# Patient Record
Sex: FEMALE | Race: WHITE | Employment: OTHER | URBAN - METROPOLITAN AREA
[De-identification: names, ages, dates, MRNs, and addresses within clinical notes are randomized per-mention and may not be internally consistent; named-entity substitution may affect disease eponyms.]

---

## 2022-11-01 ENCOUNTER — APPOINTMENT (OUTPATIENT)
Dept: NON INVASIVE DIAGNOSTICS | Age: 62
DRG: 349 | End: 2022-11-01
Payer: OTHER GOVERNMENT

## 2022-11-01 ENCOUNTER — HOSPITAL ENCOUNTER (OUTPATIENT)
Dept: PREADMISSION TESTING | Age: 62
Discharge: HOME | DRG: 349 | End: 2022-11-01
Payer: OTHER GOVERNMENT

## 2022-11-01 ENCOUNTER — TRANSCRIBE ORDER (OUTPATIENT)
Dept: REGISTRATION | Age: 62
End: 2022-11-01

## 2022-11-01 VITALS — BODY MASS INDEX: 32.14 KG/M2 | WEIGHT: 200 LBS | HEIGHT: 66 IN

## 2022-11-01 DIAGNOSIS — D37.5 NEOPLASM OF UNCERTAIN BEHAVIOR OF STOMACH, INTESTINES, AND RECTUM: Primary | ICD-10-CM

## 2022-11-01 DIAGNOSIS — D37.5 NEOPLASM OF UNCERTAIN BEHAVIOR OF STOMACH, INTESTINES, AND RECTUM: ICD-10-CM

## 2022-11-01 DIAGNOSIS — D37.1 NEOPLASM OF UNCERTAIN BEHAVIOR OF STOMACH, INTESTINES, AND RECTUM: ICD-10-CM

## 2022-11-01 DIAGNOSIS — D37.8 NEOPLASM OF UNCERTAIN BEHAVIOR OF STOMACH, INTESTINES, AND RECTUM: Primary | ICD-10-CM

## 2022-11-01 DIAGNOSIS — D37.1 NEOPLASM OF UNCERTAIN BEHAVIOR OF STOMACH, INTESTINES, AND RECTUM: Primary | ICD-10-CM

## 2022-11-01 DIAGNOSIS — D37.8 NEOPLASM OF UNCERTAIN BEHAVIOR OF STOMACH, INTESTINES, AND RECTUM: ICD-10-CM

## 2022-11-01 LAB
ABO + RH BLD: NORMAL
ANION GAP SERPL CALC-SCNC: 4 MMOL/L (ref 3–18)
BLOOD GROUP ANTIBODIES SERPL: NORMAL
BUN SERPL-MCNC: 12 MG/DL (ref 7–18)
BUN/CREAT SERPL: 14 (ref 12–20)
CALCIUM SERPL-MCNC: 9.3 MG/DL (ref 8.5–10.1)
CHLORIDE SERPL-SCNC: 107 MMOL/L (ref 100–111)
CO2 SERPL-SCNC: 29 MMOL/L (ref 21–32)
CREAT SERPL-MCNC: 0.83 MG/DL (ref 0.6–1.3)
ERYTHROCYTE [DISTWIDTH] IN BLOOD BY AUTOMATED COUNT: 12.9 % (ref 11.6–14.5)
GLUCOSE SERPL-MCNC: 99 MG/DL (ref 74–99)
HCT VFR BLD AUTO: 44.8 % (ref 35–45)
HGB BLD-MCNC: 15.2 G/DL (ref 12–16)
MCH RBC QN AUTO: 29.3 PG (ref 24–34)
MCHC RBC AUTO-ENTMCNC: 33.9 G/DL (ref 31–37)
MCV RBC AUTO: 86.5 FL (ref 78–100)
NRBC # BLD: 0 K/UL (ref 0–0.01)
NRBC BLD-RTO: 0 PER 100 WBC
PLATELET # BLD AUTO: 229 K/UL (ref 135–420)
PMV BLD AUTO: 10.8 FL (ref 9.2–11.8)
POTASSIUM SERPL-SCNC: 4.2 MMOL/L (ref 3.5–5.5)
RBC # BLD AUTO: 5.18 M/UL (ref 4.2–5.3)
SODIUM SERPL-SCNC: 140 MMOL/L (ref 136–145)
SPECIMEN EXP DATE BLD: NORMAL
WBC # BLD AUTO: 8.8 K/UL (ref 4.6–13.2)

## 2022-11-01 PROCEDURE — 80048 BASIC METABOLIC PNL TOTAL CA: CPT

## 2022-11-01 PROCEDURE — 36415 COLL VENOUS BLD VENIPUNCTURE: CPT

## 2022-11-01 PROCEDURE — 86900 BLOOD TYPING SEROLOGIC ABO: CPT

## 2022-11-01 PROCEDURE — 93005 ELECTROCARDIOGRAM TRACING: CPT

## 2022-11-01 PROCEDURE — 85027 COMPLETE CBC AUTOMATED: CPT

## 2022-11-01 RX ORDER — SODIUM CHLORIDE, SODIUM LACTATE, POTASSIUM CHLORIDE, CALCIUM CHLORIDE 600; 310; 30; 20 MG/100ML; MG/100ML; MG/100ML; MG/100ML
125 INJECTION, SOLUTION INTRAVENOUS CONTINUOUS
Status: CANCELLED | OUTPATIENT
Start: 2022-11-01

## 2022-11-01 RX ORDER — ALENDRONATE SODIUM 70 MG/1
70 TABLET ORAL
COMMUNITY
Start: 2022-10-11 | End: 2023-10-11

## 2022-11-01 RX ORDER — HEPARIN SODIUM 5000 [USP'U]/ML
5000 INJECTION, SOLUTION INTRAVENOUS; SUBCUTANEOUS ONCE
Status: CANCELLED | OUTPATIENT
Start: 2022-11-01 | End: 2022-11-01

## 2022-11-01 RX ORDER — METRONIDAZOLE 500 MG/100ML
500 INJECTION, SOLUTION INTRAVENOUS ONCE
Status: CANCELLED | OUTPATIENT
Start: 2022-11-01 | End: 2022-11-01

## 2022-11-01 RX ORDER — UREA 10 %
1 LOTION (ML) TOPICAL DAILY
COMMUNITY
Start: 2022-07-18 | End: 2023-07-18

## 2022-11-01 RX ORDER — ERGOCALCIFEROL 1.25 MG/1
50000 CAPSULE ORAL
COMMUNITY
Start: 2022-07-18 | End: 2023-10-12

## 2022-11-01 NOTE — PERIOP NOTES
PAT - SURGICAL PRE-ADMISSION INSTRUCTIONS    NAME:  Jaden Arango                                                          TODAY'S DATE:  11/1/2022    SURGERY DATE:  11/4/2022                                  SURGERY ARRIVAL TIME:   TBA    Do NOT eat or drink anything, including candy or gum, after MIDNIGHT on 11/4/2022 , unless you have specific instructions from your Surgeon or Anesthesia Provider to do so. No smoking 24 hours before surgery. No alcohol 24 hours prior to the day of surgery. No recreational drugs for one week prior to the day of surgery. Leave all valuables, including money/purse, at home. Remove all jewelry, nail polish, makeup (including mascara); no lotions, powders, deodorant, or perfume/cologne/after shave. Glasses/Contact lenses and Dentures may be worn to the hospital.  They will be removed prior to surgery. Call your doctor if symptoms of a cold or illness develop within 24 ours prior to surgery. AN ADULT MUST DRIVE YOU HOME AFTER OUTPATIENT SURGERY. If you are having an OUTPATIENT procedure, please make arrangements for a responsible adult to be with you for 24 hours after your surgery. If you are admitted to the hospital, you will be assigned to a bed after surgery is complete. Normally a family member will not be able to see you until you are in your assigned bed. 12. Visitation Restrictions Explained. Special Instructions:  Covid Test not needed Patient vaccinated, Quarantine requirements discussed  No Advanced Directive or DNR  NONE. Patient Prep:Clear liquids no solids  all day before surgery   Drink 2 bottles Ensure Presurgery nutrition drink PM  Do not chew gum . Day of surgery Do not chew gum DDrink 1 bottle of Ensure   Pre surgery nutrition drink finish 2 hours prior to arrival at THE [facility], then nothing else by mouth .  Bring all paperwork with you on day of surgery   use CHG solution three nights prior to procedure      These surgical instructions were reviewed with patient and spouse Alex Doug  during the PAT phone call

## 2022-11-02 LAB
ATRIAL RATE: 66 BPM
CALCULATED P AXIS, ECG09: 59 DEGREES
CALCULATED R AXIS, ECG10: 46 DEGREES
CALCULATED T AXIS, ECG11: 36 DEGREES
DIAGNOSIS, 93000: NORMAL
P-R INTERVAL, ECG05: 140 MS
Q-T INTERVAL, ECG07: 410 MS
QRS DURATION, ECG06: 82 MS
QTC CALCULATION (BEZET), ECG08: 429 MS
VENTRICULAR RATE, ECG03: 66 BPM

## 2022-11-04 ENCOUNTER — ANESTHESIA (OUTPATIENT)
Dept: SURGERY | Age: 62
DRG: 349 | End: 2022-11-04
Payer: OTHER GOVERNMENT

## 2022-11-04 ENCOUNTER — ANESTHESIA EVENT (OUTPATIENT)
Dept: SURGERY | Age: 62
DRG: 349 | End: 2022-11-04
Payer: OTHER GOVERNMENT

## 2022-11-04 ENCOUNTER — HOSPITAL ENCOUNTER (INPATIENT)
Age: 62
LOS: 1 days | Discharge: HOME | DRG: 349 | End: 2022-11-05
Attending: SURGERY | Admitting: SURGERY
Payer: OTHER GOVERNMENT

## 2022-11-04 PROBLEM — K62.89 RECTAL MASS: Status: ACTIVE | Noted: 2022-11-04

## 2022-11-04 LAB
INR PPP: 1.2 (ref 0.8–1.2)
PROTHROMBIN TIME: 15.4 SEC (ref 11.5–15.2)

## 2022-11-04 PROCEDURE — 77030031756 HC ACC TRANSANAL GELPNT SYS AMR -F: Performed by: SURGERY

## 2022-11-04 PROCEDURE — 76210000006 HC OR PH I REC 0.5 TO 1 HR: Performed by: SURGERY

## 2022-11-04 PROCEDURE — 77030031139 HC SUT VCRL2 J&J -A: Performed by: SURGERY

## 2022-11-04 PROCEDURE — 77030020407 HC IV BLD WRMR ST 3M -A: Performed by: ANESTHESIOLOGY

## 2022-11-04 PROCEDURE — 77030002916 HC SUT ETHLN J&J -A: Performed by: SURGERY

## 2022-11-04 PROCEDURE — 74011000254 HC RX REV CODE- 254: Performed by: SURGERY

## 2022-11-04 PROCEDURE — 74011250637 HC RX REV CODE- 250/637: Performed by: SURGERY

## 2022-11-04 PROCEDURE — 2709999900 HC NON-CHARGEABLE SUPPLY: Performed by: SURGERY

## 2022-11-04 PROCEDURE — 77030021052 HC RNG RETRCTR STAY COOP -A: Performed by: SURGERY

## 2022-11-04 PROCEDURE — 74011000250 HC RX REV CODE- 250: Performed by: SURGERY

## 2022-11-04 PROCEDURE — 77030034479 HC ADH SKN CLSR PRINEO J&J -B: Performed by: SURGERY

## 2022-11-04 PROCEDURE — 74011000250 HC RX REV CODE- 250

## 2022-11-04 PROCEDURE — 77030002901 HC SUT DEV MCLOSE MPSA - B: Performed by: SURGERY

## 2022-11-04 PROCEDURE — 74011250636 HC RX REV CODE- 250/636: Performed by: ANESTHESIOLOGY

## 2022-11-04 PROCEDURE — 77030016151 HC PROTCTR LNS DFOG COVD -B: Performed by: SURGERY

## 2022-11-04 PROCEDURE — C1758 CATHETER, URETERAL: HCPCS | Performed by: SURGERY

## 2022-11-04 PROCEDURE — 77030033138 HC SUT PGA STRATFX J&J -B: Performed by: SURGERY

## 2022-11-04 PROCEDURE — 77030018390 HC SPNG HEMSTAT2 J&J -B: Performed by: SURGERY

## 2022-11-04 PROCEDURE — 77030035279 HC SEAL VSL ENDOWR XI INTU -I2: Performed by: SURGERY

## 2022-11-04 PROCEDURE — 77030035489 HC REDUCR CANN ENDOWR INTU -C: Performed by: SURGERY

## 2022-11-04 PROCEDURE — 77030002966 HC SUT PDS J&J -A: Performed by: SURGERY

## 2022-11-04 PROCEDURE — 88305 TISSUE EXAM BY PATHOLOGIST: CPT

## 2022-11-04 PROCEDURE — 76060000038 HC ANESTHESIA 3.5 TO 4 HR: Performed by: SURGERY

## 2022-11-04 PROCEDURE — 77030040361 HC SLV COMPR DVT MDII -B: Performed by: SURGERY

## 2022-11-04 PROCEDURE — 85610 PROTHROMBIN TIME: CPT

## 2022-11-04 PROCEDURE — 74011250636 HC RX REV CODE- 250/636: Performed by: SURGERY

## 2022-11-04 PROCEDURE — 77030040506 HC DRN WND MDII -A: Performed by: SURGERY

## 2022-11-04 PROCEDURE — 77010033678 HC OXYGEN DAILY

## 2022-11-04 PROCEDURE — 36415 COLL VENOUS BLD VENIPUNCTURE: CPT

## 2022-11-04 PROCEDURE — 74011250636 HC RX REV CODE- 250/636: Performed by: NURSE ANESTHETIST, CERTIFIED REGISTERED

## 2022-11-04 PROCEDURE — 77030031492 HC PRT ACC BLNT AIRSEAL CNMD -B: Performed by: SURGERY

## 2022-11-04 PROCEDURE — 0T768DZ DILATION OF RIGHT URETER WITH INTRALUMINAL DEVICE, VIA NATURAL OR ARTIFICIAL OPENING ENDOSCOPIC: ICD-10-PCS | Performed by: SURGERY

## 2022-11-04 PROCEDURE — 77030020703 HC SEAL CANN DISP INTU -B: Performed by: SURGERY

## 2022-11-04 PROCEDURE — 77030040830 HC CATH URETH FOL MDII -A: Performed by: SURGERY

## 2022-11-04 PROCEDURE — 77030027138 HC INCENT SPIROMETER -A: Performed by: SURGERY

## 2022-11-04 PROCEDURE — 77030002996 HC SUT SLK J&J -A: Performed by: SURGERY

## 2022-11-04 PROCEDURE — 77030020782 HC GWN BAIR PAWS FLX 3M -B: Performed by: SURGERY

## 2022-11-04 PROCEDURE — 65270000029 HC RM PRIVATE

## 2022-11-04 PROCEDURE — 76010000134 HC OR TIME 3.5 TO 4 HR: Performed by: SURGERY

## 2022-11-04 PROCEDURE — 74011000258 HC RX REV CODE- 258: Performed by: SURGERY

## 2022-11-04 PROCEDURE — 77030008574 HC TBNG SUC IRR STRY -B: Performed by: SURGERY

## 2022-11-04 PROCEDURE — 77030010031 HC SCIS ENDOSC MPLR J&J -C: Performed by: SURGERY

## 2022-11-04 PROCEDURE — 77030035236 HC SUT PDS STRATFX BARB J&J -B: Performed by: SURGERY

## 2022-11-04 PROCEDURE — 0DBP7ZZ EXCISION OF RECTUM, VIA NATURAL OR ARTIFICIAL OPENING: ICD-10-PCS | Performed by: SURGERY

## 2022-11-04 PROCEDURE — 77030018836 HC SOL IRR NACL ICUM -A: Performed by: SURGERY

## 2022-11-04 PROCEDURE — 74011000250 HC RX REV CODE- 250: Performed by: NURSE ANESTHETIST, CERTIFIED REGISTERED

## 2022-11-04 PROCEDURE — C1769 GUIDE WIRE: HCPCS | Performed by: SURGERY

## 2022-11-04 PROCEDURE — 97161 PT EVAL LOW COMPLEX 20 MIN: CPT

## 2022-11-04 PROCEDURE — 77030008477 HC STYL SATN SLP COVD -A: Performed by: ANESTHESIOLOGY

## 2022-11-04 PROCEDURE — 77030010011 HC RNG RETRCTR STAY COOP -B: Performed by: SURGERY

## 2022-11-04 PROCEDURE — 77030035277 HC OBTRTR BLDELSS DISP INTU -B: Performed by: SURGERY

## 2022-11-04 PROCEDURE — 97116 GAIT TRAINING THERAPY: CPT

## 2022-11-04 PROCEDURE — 77030008683 HC TU ET CUF COVD -A: Performed by: ANESTHESIOLOGY

## 2022-11-04 PROCEDURE — 77030040504 HC DRN WND MDII -B: Performed by: SURGERY

## 2022-11-04 PROCEDURE — 77030002933 HC SUT MCRYL J&J -A: Performed by: SURGERY

## 2022-11-04 PROCEDURE — 77030014007 HC SPNG HEMSTAT J&J -B: Performed by: SURGERY

## 2022-11-04 PROCEDURE — 77030012852 HC LIGASURE LAP SEAL COVD -F: Performed by: SURGERY

## 2022-11-04 PROCEDURE — 77030006643: Performed by: ANESTHESIOLOGY

## 2022-11-04 RX ORDER — ONDANSETRON 2 MG/ML
4 INJECTION INTRAMUSCULAR; INTRAVENOUS
Status: DISCONTINUED | OUTPATIENT
Start: 2022-11-04 | End: 2022-11-05 | Stop reason: HOSPADM

## 2022-11-04 RX ORDER — SODIUM CHLORIDE 0.9 % (FLUSH) 0.9 %
5-40 SYRINGE (ML) INJECTION AS NEEDED
Status: DISCONTINUED | OUTPATIENT
Start: 2022-11-04 | End: 2022-11-04 | Stop reason: HOSPADM

## 2022-11-04 RX ORDER — GLYCOPYRROLATE 0.2 MG/ML
INJECTION INTRAMUSCULAR; INTRAVENOUS AS NEEDED
Status: DISCONTINUED | OUTPATIENT
Start: 2022-11-04 | End: 2022-11-04 | Stop reason: HOSPADM

## 2022-11-04 RX ORDER — DEXTROSE, SODIUM CHLORIDE, AND POTASSIUM CHLORIDE 5; .45; .15 G/100ML; G/100ML; G/100ML
125 INJECTION INTRAVENOUS CONTINUOUS
Status: DISCONTINUED | OUTPATIENT
Start: 2022-11-04 | End: 2022-11-05 | Stop reason: HOSPADM

## 2022-11-04 RX ORDER — INDOCYANINE GREEN AND WATER 25 MG
KIT INJECTION AS NEEDED
Status: DISCONTINUED | OUTPATIENT
Start: 2022-11-04 | End: 2022-11-04 | Stop reason: HOSPADM

## 2022-11-04 RX ORDER — SODIUM CHLORIDE 0.9 % (FLUSH) 0.9 %
5-40 SYRINGE (ML) INJECTION EVERY 8 HOURS
Status: DISCONTINUED | OUTPATIENT
Start: 2022-11-04 | End: 2022-11-05 | Stop reason: HOSPADM

## 2022-11-04 RX ORDER — METRONIDAZOLE 500 MG/100ML
500 INJECTION, SOLUTION INTRAVENOUS EVERY 12 HOURS
Status: DISCONTINUED | OUTPATIENT
Start: 2022-11-04 | End: 2022-11-05 | Stop reason: HOSPADM

## 2022-11-04 RX ORDER — POTASSIUM CHLORIDE 7.45 MG/ML
10 INJECTION INTRAVENOUS AS NEEDED
Status: DISCONTINUED | OUTPATIENT
Start: 2022-11-04 | End: 2022-11-05 | Stop reason: HOSPADM

## 2022-11-04 RX ORDER — ONDANSETRON 2 MG/ML
INJECTION INTRAMUSCULAR; INTRAVENOUS AS NEEDED
Status: DISCONTINUED | OUTPATIENT
Start: 2022-11-04 | End: 2022-11-04 | Stop reason: HOSPADM

## 2022-11-04 RX ORDER — LIDOCAINE HYDROCHLORIDE 20 MG/ML
JELLY TOPICAL AS NEEDED
Status: DISCONTINUED | OUTPATIENT
Start: 2022-11-04 | End: 2022-11-04 | Stop reason: HOSPADM

## 2022-11-04 RX ORDER — ALBUTEROL SULFATE 0.83 MG/ML
2.5 SOLUTION RESPIRATORY (INHALATION) AS NEEDED
Status: DISCONTINUED | OUTPATIENT
Start: 2022-11-04 | End: 2022-11-04 | Stop reason: HOSPADM

## 2022-11-04 RX ORDER — LIDOCAINE HYDROCHLORIDE 20 MG/ML
INJECTION, SOLUTION EPIDURAL; INFILTRATION; INTRACAUDAL; PERINEURAL AS NEEDED
Status: DISCONTINUED | OUTPATIENT
Start: 2022-11-04 | End: 2022-11-04 | Stop reason: HOSPADM

## 2022-11-04 RX ORDER — SODIUM CHLORIDE 0.9 % (FLUSH) 0.9 %
5-40 SYRINGE (ML) INJECTION EVERY 8 HOURS
Status: DISCONTINUED | OUTPATIENT
Start: 2022-11-04 | End: 2022-11-04 | Stop reason: HOSPADM

## 2022-11-04 RX ORDER — MAGNESIUM SULFATE HEPTAHYDRATE 40 MG/ML
2 INJECTION, SOLUTION INTRAVENOUS AS NEEDED
Status: DISCONTINUED | OUTPATIENT
Start: 2022-11-04 | End: 2022-11-05 | Stop reason: HOSPADM

## 2022-11-04 RX ORDER — SODIUM CHLORIDE 9 MG/ML
50 INJECTION, SOLUTION INTRAVENOUS CONTINUOUS
Status: DISCONTINUED | OUTPATIENT
Start: 2022-11-04 | End: 2022-11-05 | Stop reason: HOSPADM

## 2022-11-04 RX ORDER — METOCLOPRAMIDE HYDROCHLORIDE 5 MG/ML
INJECTION INTRAMUSCULAR; INTRAVENOUS AS NEEDED
Status: DISCONTINUED | OUTPATIENT
Start: 2022-11-04 | End: 2022-11-04 | Stop reason: HOSPADM

## 2022-11-04 RX ORDER — ROCURONIUM BROMIDE 10 MG/ML
INJECTION, SOLUTION INTRAVENOUS AS NEEDED
Status: DISCONTINUED | OUTPATIENT
Start: 2022-11-04 | End: 2022-11-04 | Stop reason: HOSPADM

## 2022-11-04 RX ORDER — MAGNESIUM SULFATE 100 %
4 CRYSTALS MISCELLANEOUS AS NEEDED
Status: DISCONTINUED | OUTPATIENT
Start: 2022-11-04 | End: 2022-11-04 | Stop reason: HOSPADM

## 2022-11-04 RX ORDER — SUCCINYLCHOLINE CHLORIDE 100 MG/5ML
SYRINGE (ML) INTRAVENOUS AS NEEDED
Status: DISCONTINUED | OUTPATIENT
Start: 2022-11-04 | End: 2022-11-04 | Stop reason: HOSPADM

## 2022-11-04 RX ORDER — KETAMINE HCL IN 0.9 % NACL 50 MG/5 ML
SYRINGE (ML) INTRAVENOUS AS NEEDED
Status: DISCONTINUED | OUTPATIENT
Start: 2022-11-04 | End: 2022-11-04 | Stop reason: HOSPADM

## 2022-11-04 RX ORDER — SODIUM CHLORIDE, SODIUM LACTATE, POTASSIUM CHLORIDE, CALCIUM CHLORIDE 600; 310; 30; 20 MG/100ML; MG/100ML; MG/100ML; MG/100ML
50 INJECTION, SOLUTION INTRAVENOUS CONTINUOUS
Status: DISCONTINUED | OUTPATIENT
Start: 2022-11-04 | End: 2022-11-04

## 2022-11-04 RX ORDER — FENTANYL CITRATE 50 UG/ML
INJECTION, SOLUTION INTRAMUSCULAR; INTRAVENOUS AS NEEDED
Status: DISCONTINUED | OUTPATIENT
Start: 2022-11-04 | End: 2022-11-04 | Stop reason: HOSPADM

## 2022-11-04 RX ORDER — MORPHINE SULFATE 4 MG/ML
2 INJECTION INTRAVENOUS
Status: DISCONTINUED | OUTPATIENT
Start: 2022-11-04 | End: 2022-11-05 | Stop reason: HOSPADM

## 2022-11-04 RX ORDER — METRONIDAZOLE 500 MG/100ML
500 INJECTION, SOLUTION INTRAVENOUS ONCE
Status: COMPLETED | OUTPATIENT
Start: 2022-11-04 | End: 2022-11-04

## 2022-11-04 RX ORDER — SODIUM CHLORIDE, SODIUM LACTATE, POTASSIUM CHLORIDE, CALCIUM CHLORIDE 600; 310; 30; 20 MG/100ML; MG/100ML; MG/100ML; MG/100ML
INJECTION, SOLUTION INTRAVENOUS
Status: DISCONTINUED | OUTPATIENT
Start: 2022-11-04 | End: 2022-11-04 | Stop reason: HOSPADM

## 2022-11-04 RX ORDER — HEPARIN SODIUM 5000 [USP'U]/ML
5000 INJECTION, SOLUTION INTRAVENOUS; SUBCUTANEOUS ONCE
Status: COMPLETED | OUTPATIENT
Start: 2022-11-04 | End: 2022-11-04

## 2022-11-04 RX ORDER — PROPOFOL 10 MG/ML
INJECTION, EMULSION INTRAVENOUS AS NEEDED
Status: DISCONTINUED | OUTPATIENT
Start: 2022-11-04 | End: 2022-11-04 | Stop reason: HOSPADM

## 2022-11-04 RX ORDER — ONDANSETRON 4 MG/1
4 TABLET, ORALLY DISINTEGRATING ORAL
Status: DISCONTINUED | OUTPATIENT
Start: 2022-11-04 | End: 2022-11-05 | Stop reason: HOSPADM

## 2022-11-04 RX ORDER — INSULIN LISPRO 100 [IU]/ML
INJECTION, SOLUTION INTRAVENOUS; SUBCUTANEOUS ONCE
Status: DISCONTINUED | OUTPATIENT
Start: 2022-11-04 | End: 2022-11-04 | Stop reason: HOSPADM

## 2022-11-04 RX ORDER — HYDROMORPHONE HYDROCHLORIDE 1 MG/ML
0.5 INJECTION, SOLUTION INTRAMUSCULAR; INTRAVENOUS; SUBCUTANEOUS
Status: DISCONTINUED | OUTPATIENT
Start: 2022-11-04 | End: 2022-11-04 | Stop reason: HOSPADM

## 2022-11-04 RX ORDER — MIDAZOLAM HYDROCHLORIDE 1 MG/ML
INJECTION, SOLUTION INTRAMUSCULAR; INTRAVENOUS AS NEEDED
Status: DISCONTINUED | OUTPATIENT
Start: 2022-11-04 | End: 2022-11-04 | Stop reason: HOSPADM

## 2022-11-04 RX ORDER — FENTANYL CITRATE 50 UG/ML
25 INJECTION, SOLUTION INTRAMUSCULAR; INTRAVENOUS AS NEEDED
Status: DISCONTINUED | OUTPATIENT
Start: 2022-11-04 | End: 2022-11-04 | Stop reason: HOSPADM

## 2022-11-04 RX ORDER — KETOROLAC TROMETHAMINE 30 MG/ML
30 INJECTION, SOLUTION INTRAMUSCULAR; INTRAVENOUS EVERY 6 HOURS
Status: DISCONTINUED | OUTPATIENT
Start: 2022-11-04 | End: 2022-11-05 | Stop reason: HOSPADM

## 2022-11-04 RX ORDER — PHENYLEPHRINE HCL IN 0.9% NACL 1 MG/10 ML
SYRINGE (ML) INTRAVENOUS AS NEEDED
Status: DISCONTINUED | OUTPATIENT
Start: 2022-11-04 | End: 2022-11-04 | Stop reason: HOSPADM

## 2022-11-04 RX ORDER — MORPHINE SULFATE 4 MG/ML
4 INJECTION INTRAVENOUS
Status: DISCONTINUED | OUTPATIENT
Start: 2022-11-04 | End: 2022-11-05 | Stop reason: HOSPADM

## 2022-11-04 RX ORDER — DEXAMETHASONE SODIUM PHOSPHATE 4 MG/ML
INJECTION, SOLUTION INTRA-ARTICULAR; INTRALESIONAL; INTRAMUSCULAR; INTRAVENOUS; SOFT TISSUE AS NEEDED
Status: DISCONTINUED | OUTPATIENT
Start: 2022-11-04 | End: 2022-11-04 | Stop reason: HOSPADM

## 2022-11-04 RX ORDER — ACETAMINOPHEN 500 MG
1000 TABLET ORAL 3 TIMES DAILY
Status: DISCONTINUED | OUTPATIENT
Start: 2022-11-04 | End: 2022-11-05 | Stop reason: HOSPADM

## 2022-11-04 RX ORDER — SODIUM CHLORIDE 0.9 % (FLUSH) 0.9 %
5-40 SYRINGE (ML) INJECTION AS NEEDED
Status: DISCONTINUED | OUTPATIENT
Start: 2022-11-04 | End: 2022-11-05 | Stop reason: HOSPADM

## 2022-11-04 RX ORDER — SODIUM CHLORIDE, SODIUM LACTATE, POTASSIUM CHLORIDE, CALCIUM CHLORIDE 600; 310; 30; 20 MG/100ML; MG/100ML; MG/100ML; MG/100ML
75 INJECTION, SOLUTION INTRAVENOUS CONTINUOUS
Status: DISCONTINUED | OUTPATIENT
Start: 2022-11-04 | End: 2022-11-04 | Stop reason: HOSPADM

## 2022-11-04 RX ORDER — SODIUM CHLORIDE, SODIUM LACTATE, POTASSIUM CHLORIDE, CALCIUM CHLORIDE 600; 310; 30; 20 MG/100ML; MG/100ML; MG/100ML; MG/100ML
125 INJECTION, SOLUTION INTRAVENOUS CONTINUOUS
Status: DISCONTINUED | OUTPATIENT
Start: 2022-11-04 | End: 2022-11-05 | Stop reason: HOSPADM

## 2022-11-04 RX ADMIN — ACETAMINOPHEN 1000 MG: 500 TABLET ORAL at 21:24

## 2022-11-04 RX ADMIN — MIDAZOLAM 2 MG: 1 INJECTION INTRAMUSCULAR; INTRAVENOUS at 07:27

## 2022-11-04 RX ADMIN — FENTANYL CITRATE 50 MCG: 50 INJECTION, SOLUTION INTRAMUSCULAR; INTRAVENOUS at 07:34

## 2022-11-04 RX ADMIN — HYDROMORPHONE HYDROCHLORIDE 0.5 MG: 1 INJECTION, SOLUTION INTRAMUSCULAR; INTRAVENOUS; SUBCUTANEOUS at 11:26

## 2022-11-04 RX ADMIN — Medication 10 MG: at 09:53

## 2022-11-04 RX ADMIN — ONDANSETRON HYDROCHLORIDE 4 MG: 2 INJECTION INTRAMUSCULAR; INTRAVENOUS at 10:48

## 2022-11-04 RX ADMIN — HEPARIN SODIUM 5000 UNITS: 5000 INJECTION INTRAVENOUS; SUBCUTANEOUS at 06:31

## 2022-11-04 RX ADMIN — Medication 100 MCG: at 07:50

## 2022-11-04 RX ADMIN — SODIUM CHLORIDE 50 ML/HR: 9 INJECTION, SOLUTION INTRAVENOUS at 06:13

## 2022-11-04 RX ADMIN — FENTANYL CITRATE 25 MCG: 50 INJECTION, SOLUTION INTRAMUSCULAR; INTRAVENOUS at 10:18

## 2022-11-04 RX ADMIN — Medication 100 MCG: at 07:41

## 2022-11-04 RX ADMIN — SODIUM CHLORIDE, SODIUM LACTATE, POTASSIUM CHLORIDE, AND CALCIUM CHLORIDE 125 ML/HR: 600; 310; 30; 20 INJECTION, SOLUTION INTRAVENOUS at 06:07

## 2022-11-04 RX ADMIN — PROPOFOL 180 MG: 10 INJECTION, EMULSION INTRAVENOUS at 07:36

## 2022-11-04 RX ADMIN — Medication 100 MCG: at 07:52

## 2022-11-04 RX ADMIN — GLYCOPYRROLATE 0.2 MG: 0.2 INJECTION INTRAMUSCULAR; INTRAVENOUS at 07:27

## 2022-11-04 RX ADMIN — SODIUM CHLORIDE, SODIUM LACTATE, POTASSIUM CHLORIDE, AND CALCIUM CHLORIDE: 600; 310; 30; 20 INJECTION, SOLUTION INTRAVENOUS at 08:16

## 2022-11-04 RX ADMIN — DEXAMETHASONE SODIUM PHOSPHATE 8 MG: 4 INJECTION, SOLUTION INTRAMUSCULAR; INTRAVENOUS at 08:00

## 2022-11-04 RX ADMIN — Medication 100 MCG: at 08:41

## 2022-11-04 RX ADMIN — Medication 140 MG: at 07:36

## 2022-11-04 RX ADMIN — Medication 100 MCG: at 08:17

## 2022-11-04 RX ADMIN — ROCURONIUM BROMIDE 10 MG: 10 INJECTION, SOLUTION INTRAVENOUS at 07:36

## 2022-11-04 RX ADMIN — Medication 100 MCG: at 08:36

## 2022-11-04 RX ADMIN — MORPHINE SULFATE 4 MG: 4 INJECTION INTRAVENOUS at 12:34

## 2022-11-04 RX ADMIN — LIDOCAINE HYDROCHLORIDE 100 MG: 20 INJECTION, SOLUTION INTRAVENOUS at 07:36

## 2022-11-04 RX ADMIN — Medication 100 MCG: at 08:56

## 2022-11-04 RX ADMIN — CEFTRIAXONE SODIUM 2 G: 2 INJECTION, POWDER, FOR SOLUTION INTRAMUSCULAR; INTRAVENOUS at 07:27

## 2022-11-04 RX ADMIN — POTASSIUM CHLORIDE, DEXTROSE MONOHYDRATE AND SODIUM CHLORIDE 125 ML/HR: 150; 5; 450 INJECTION, SOLUTION INTRAVENOUS at 21:25

## 2022-11-04 RX ADMIN — FENTANYL CITRATE 50 MCG: 50 INJECTION, SOLUTION INTRAMUSCULAR; INTRAVENOUS at 07:30

## 2022-11-04 RX ADMIN — Medication 100 MCG: at 08:21

## 2022-11-04 RX ADMIN — ROCURONIUM BROMIDE 20 MG: 10 INJECTION, SOLUTION INTRAVENOUS at 08:07

## 2022-11-04 RX ADMIN — Medication 10 MG: at 09:33

## 2022-11-04 RX ADMIN — SODIUM CHLORIDE, SODIUM LACTATE, POTASSIUM CHLORIDE, AND CALCIUM CHLORIDE: 600; 310; 30; 20 INJECTION, SOLUTION INTRAVENOUS at 08:24

## 2022-11-04 RX ADMIN — ROCURONIUM BROMIDE 40 MG: 10 INJECTION, SOLUTION INTRAVENOUS at 07:44

## 2022-11-04 RX ADMIN — ROCURONIUM BROMIDE 20 MG: 10 INJECTION, SOLUTION INTRAVENOUS at 09:53

## 2022-11-04 RX ADMIN — METRONIDAZOLE 500 MG: 500 INJECTION, SOLUTION INTRAVENOUS at 07:44

## 2022-11-04 RX ADMIN — Medication 100 MCG: at 07:36

## 2022-11-04 RX ADMIN — ROCURONIUM BROMIDE 30 MG: 10 INJECTION, SOLUTION INTRAVENOUS at 08:22

## 2022-11-04 RX ADMIN — Medication 10 MG: at 08:36

## 2022-11-04 RX ADMIN — Medication 10 MG: at 09:01

## 2022-11-04 RX ADMIN — Medication 100 MCG: at 08:02

## 2022-11-04 RX ADMIN — Medication 100 MCG: at 08:12

## 2022-11-04 RX ADMIN — KETOROLAC TROMETHAMINE 30 MG: 30 INJECTION, SOLUTION INTRAMUSCULAR; INTRAVENOUS at 12:34

## 2022-11-04 RX ADMIN — Medication 10 MG: at 07:57

## 2022-11-04 RX ADMIN — KETOROLAC TROMETHAMINE 30 MG: 30 INJECTION, SOLUTION INTRAMUSCULAR; INTRAVENOUS at 17:37

## 2022-11-04 RX ADMIN — ACETAMINOPHEN 1000 MG: 500 TABLET ORAL at 17:36

## 2022-11-04 RX ADMIN — Medication 100 MCG: at 08:28

## 2022-11-04 RX ADMIN — Medication 100 MCG: at 07:56

## 2022-11-04 RX ADMIN — METRONIDAZOLE 500 MG: 500 INJECTION, SOLUTION INTRAVENOUS at 21:25

## 2022-11-04 RX ADMIN — ROCURONIUM BROMIDE 10 MG: 10 INJECTION, SOLUTION INTRAVENOUS at 10:20

## 2022-11-04 RX ADMIN — SODIUM CHLORIDE, PRESERVATIVE FREE 10 ML: 5 INJECTION INTRAVENOUS at 21:27

## 2022-11-04 RX ADMIN — METOCLOPRAMIDE 10 MG: 5 INJECTION, SOLUTION INTRAMUSCULAR; INTRAVENOUS at 10:48

## 2022-11-04 RX ADMIN — POTASSIUM CHLORIDE, DEXTROSE MONOHYDRATE AND SODIUM CHLORIDE 125 ML/HR: 150; 5; 450 INJECTION, SOLUTION INTRAVENOUS at 12:35

## 2022-11-04 RX ADMIN — ROCURONIUM BROMIDE 10 MG: 10 INJECTION, SOLUTION INTRAVENOUS at 09:16

## 2022-11-04 NOTE — OP NOTES
OPERATIVE NOTE    Patient: Bernie Bradshaw MRN: [id number]  SSN: [id number]    YOB: 1960  Age: 65 y.o. Sex: female      Indications: This is a 65y.o. year-old female who presents with rectal mass unresectable by endoscopic means. she  was positive for possible T1 lesion vs polyp by MRI. The patient was admitted for surgery for definitive diagnosis and cancer rule out. Date of Procedure: 11/4/2022     Preoperative Diagnosis: RECTAL MASS    Postoperative Diagnosis: RECTAL MASS      Procedure: Procedure(s):  TRANSANAL EXCISION MINIMALLY INVASIVE SURGERY (TAMIS)RESECTION OF RECTAL MASS MICROSURGICAL APPROACH - FULL THICKNESS , CPT 0184T  CYSTOSCOPY WITH BILATERAL URETERAL CATHETER PLACEMENT AND INJECTION  **ERAS**    Surgeon(s): Surgeon(s) and Role:     Mendoza Hoffmann DO - Primary    Assistant(s): Circ-1: Machelle Dover RN  Circ-2: Fanny Brower RN  Scrub Tech-1: Gabriela Ashford  Surg Asst-1: Marie Madden    Anesthesia: General     Procedure: The patient was consented and all questions answered. Consent was verified. The patient was brought to the OR and placed in the supine position. Pre-operative antibiotics were dosed prior to the incision. Heparin was given in the preoperative holding area for DVT prophylaxis. The patient was secured with a lower body strap and all pressure points were padded. A ranulfo hugger warming unit was placed across the upper body and SCDs were placed to bilateral lower extremities. General anesthesia was then induced without complication. A Garcia catheter was placed after the patient was anesthetized. The patients abdomen was prepped and draped in the standard sterile fashion. A surgical timeout was completed. A 30 degree cystoscope was placed under direct visualization through the urethra into the bladder. The right and left ureteral orifices were identified.   Under direct visualization stent was placed over a wire into the right ureteral orifice to approximately 15 cm. ICG 3ccs was injected and flushed with saline. This was repeated in the left ureteral orifice without difficulty. The cystoscope was then removed. A Garcia was then placed into the bladder and after confirmation in the bladder the balloon was insufflated. A regional anal block was performed with exparal.   A LI was performed and the staple line could not be palpated to 6cm. A lone star retractor was placed and the [employer] TAMIS 5.5cm port was placed into the anal canal without difficulty. The rectum was insufflated and a camera was placed into the lumen. Upon visual inspection the mass was identified in the posterior right approximately 11-12cm from the anal verge. The mass was on the superior edge of the haustral fold in a difficult location. The mass was grasped and elevated. It was not fixed. A full thickness circumferential resection was performed with ligasure to the mesenteric fat. The mass was removed. No apparent complications were noted and the peritoneum was not entered. The proctotomy was then closed with a running 2-0 pds v-loc. The area was irrigated and hemostasis was assured. The TAMIS device was then removed along with the lone star. A gelfoam with surgicel was placed for hemorrhoidal oozing. The patient was awakened, extubated and transferred to the PACU in stable condition having tolerated the procedure well. Findings: right posterolateral behind valve at 10-12cm from verge. 4cm - 1/4 of lumen, non-fixed    Estimated Blood Loss: Minimal    Specimens:   ID Type Source Tests Collected by Time Destination   1 : Rectal Mass Preservative Rectal  Clarke Veronica,  11/4/2022 1816 Pathology        Drains: none    Implants: * No implants in log *    Complications: None; patient tolerated the procedure well.     Ariel Hayes,   11/4/2022  11:39 AM

## 2022-11-04 NOTE — PERIOP NOTES
Yes No N/A Patient confirms that: Comments:    [x]   []     []    Showered with CHG for 3 days prior to surgery     [x]   []   [] Changed bed linen daily x 3 days prior to surgery     [x]   []     []    Used fresh towel/pajamas daily x 3 days prior to surgery     [x]   []   [] Clear liquids only day prior to surgery     [x]   []     []    Completed Ensure Pre Surgery Drink x 2 PM of surgery     [x]   []   [] Completed Bowel Prep night prior to surgery     [x]   []     []    Completed Flagyl (1000mg at 1p, 2, & 11p)     [x]   []   [] Completed Neomycin (1000mg at 1p, 2p, 11,)     [x]   []   [] Completed Gabapentin (300mg 8a, 4p, MN)       On day of surgery, patient verified that:     [x]   []   [] Drank 1 bottle of Ensure Pre Surgery Drink     [x]   []     []    Brushed/flossed teeth

## 2022-11-04 NOTE — PROGRESS NOTES
Problem: Mobility Impaired (Adult and Pediatric)  Goal: *Acute Goals and Plan of Care (Insert Text)  Description: Physical Therapy Goals  Initiated 11/4/2022 and to be accomplished within 7 day(s)  1. Patient will move from supine to sit and sit to supine  in bed with supervision/set-up. 2.  Patient will transfer from bed to chair and chair to bed with supervision/set-up using the least restrictive device. 3.  Patient will perform sit to stand with supervision/set-up. 4.  Patient will ambulate with supervision/set-up for 150 feet with the least restrictive device. 5.  Patient will ascend/descend 4 stairs with handrail(s) with supervision/set-up. Note:   PHYSICAL THERAPY EVALUATION    Patient: Javid Rodriguez (65 y.o. female)  Date: 11/4/2022  Primary Diagnosis: Rectal mass [K62.89]  Procedure(s) (LRB):  TRANSANAL EXCISION MINIMALLY INVASIVE SURGERY (TAMIS)RESECTION OF RECTAL MASS, (N/A)  CYSTOSCOPY WITH BILATERAL URETERAL CATHETER PLACEMENT AND INJECTION  **ERAS** (N/A) Day of Surgery   Precautions:   Fall    ASSESSMENT :  Based on the objective data described below, the patient presents with lower extremity weakness, decreased gait quality, impaired bed mobility and transfers, and decreased activity tolerance. Pt performed supine to sit with SBA, sit to stand with CGA. Patient ambulated 75 feet with RW, GB applied, CGA; L deviation during ambulation and unsteadiness requiring CGA, improved with continued ambulation. BP in supine 96/61, sitting 109/74, post ambulation and returned to supine 105/66. Pt tolerated session well as evidenced by slight lightheadedness initially upon sitting resolved with continued sitting. Will progress mobility as pt tolerates. Patient will benefit from skilled intervention to address the above impairments.   Patient's rehabilitation potential is considered to be Good  Factors which may influence rehabilitation potential include:   []         None noted  []         Mental ability/status  [x]         Medical condition  []         Home/family situation and support systems  []         Safety awareness  []         Pain tolerance/management  []         Other:      PLAN :  Recommendations and Planned Interventions:   [x]           Bed Mobility Training             []    Neuromuscular Re-Education  [x]           Transfer Training                   []    Orthotic/Prosthetic Training  [x]           Gait Training                          []    Modalities  [x]           Therapeutic Exercises           []    Edema Management/Control  [x]           Therapeutic Activities            []    Family Training/Education  [x]           Patient Education  []           Other (comment):    Frequency/Duration: Patient will be followed by physical therapy 1-2 times per day/4-7 days per week to address goals. Discharge Recommendations: Home Health versus None  Further Equipment Recommendations for Discharge: N/A    AMPAC: 15/20    This AMPAC score should be considered in conjunction with interdisciplinary team recommendations to determine the most appropriate discharge setting. Patient's social support, diagnosis, medical stability, and prior level of function should also be taken into consideration. SUBJECTIVE:   Patient stated Sure let's try.     OBJECTIVE DATA SUMMARY:     Past Medical History:   Diagnosis Date    Adverse effect of anesthesia     lost voice after one of her surgery    Cancer ([facility] [address]     basal cell removed legs , nose     Past Surgical History:   Procedure Laterality Date    HX HEENT      teeth removed    HX HYSTERECTOMY      HX OTHER SURGICAL      part lung shaved off age 20's    HX OTHER SURGICAL      colonoscopy     Barriers to Learning/Limitations: None  Compensate with: Visual Cues and Verbal Cues  PLOF: Independent ambulation without AD  Home Situation:  Home Situation  Home Environment: Private residence  # Steps to Enter: 4  Rails to Enter: Yes  Hand Rails : Bilateral  One/Two Story Residence: [facility] story  # of Interior Steps: [id number] [facility] Floor: Right  Living Alone: No  Support Systems: Spouse/Significant Other  Patient Expects to be Discharged to[de-identified] Home  Current DME Used/Available at Home: Walker, rolling  Critical Behavior:  Neurologic State: Alert; Appropriate for age  Psychosocial  Purposeful Interaction: Yes  Skin Condition/Temp: Warm;Dry   Skin Integrity: Other (comment) (rectal excision)  Skin Integumentary  Skin Color: Appropriate for ethnicity  Skin Condition/Temp: Warm;Dry  Skin Integrity: Other (comment) (rectal excision)  Turgor: Non-tenting   Strength:    Strength: Generally decreased, functional  Tone & Sensation:   Tone: Normal  Range Of Motion:  AROM: Generally decreased, functional   Functional Mobility:  Bed Mobility:   Supine to Sit: Stand-by assistance  Sit to Supine: Stand-by assistance   Transfers:  Sit to Stand: Contact guard assistance  Stand to Sit: Contact guard assistance  Balance:   Sitting: Intact  Standing: Intact; With support  Ambulation/Gait Training:  Distance (ft): 75 Feet (ft)  Assistive Device: Gait belt;Walker, rolling  Ambulation - Level of Assistance: Stand-by assistance   Gait Description (WDL): Exceptions to WDL  Gait Abnormalities: Decreased step clearance  Base of Support: Narrowed; Shift to left   Speed/Norma: Slow  Step Length: Right shortened;Left shortened  Pain:  Pain level pre-treatment: 3/10   Pain level post-treatment: 3/10     Activity Tolerance:   Good  Please refer to the flowsheet for vital signs taken during this treatment. After treatment:   []         Patient left in no apparent distress sitting up in chair  [x]         Patient left in no apparent distress in bed  [x]         Call bell left within reach  [x]         Nursing notified  []         Caregiver present  []         Bed alarm activated  []         SCDs applied    COMMUNICATION/EDUCATION:   [x]         Role of Physical Therapy in the acute care setting.   [x]         Fall prevention education was provided and the patient/caregiver indicated understanding. [x]         Patient/family have participated as able in goal setting and plan of care. []         Patient/family agree to work toward stated goals and plan of care. []         Patient understands intent and goals of therapy, but is neutral about his/her participation. []         Patient is unable to participate in goal setting/plan of care: ongoing with therapy staff.  []         Other: Thank you for this referral.  Allie Leroycomb   Time Calculation: 25 mins      Eval Complexity: History: MEDIUM  Complexity : 1-2 comorbidities / personal factors will impact the outcome/ POC Exam:LOW Complexity : 1-2 Standardized tests and measures addressing body structure, function, activity limitation and / or participation in recreation  Presentation: LOW Complexity : Stable, uncomplicated  Clinical Decision Making:Low Complexity    Overall Complexity:LOW     [facility] AM-PAC® Basic Mobility Inpatient Short Form (6-Clicks) Version 2    How much HELP from another person does the patient currently need    (If the patient hasn't done an activity recently, how much help from another person do you think he/she would need if he/she tried?)   Total (Total A or Dep)   A Lot  (Mod to Max A)   A Little (Sup or Min A)   None (Mod I to I)   Turning from your back to your side while in a flat bed without using bedrails? [] 1 [] 2 [x] 3 [] 4   2. Moving from lying on your back to sitting on the side of a flat bed without using bedrails? [] 1 [] 2 [x] 3 [] 4   3. Moving to and from a bed to a chair (including a wheelchair)? [] 1 [] 2 [x] 3 [] 4   4. Standing up from a chair using your arms (e.g., wheelchair, or bedside chair)? [] 1 [] 2 [x] 3 [] 4   5. Walking in hospital room? [] 1 [] 2 [x] 3 [] 4   6. Climbing 3-5 steps with a railing?+   [] 1 [] 2 [] 3 [] 4   +If stair climbing cannot be assessed, skip item #6.   Sum responses from items 1-5. Based on an AM-PAC score of 15/20 and their current functional mobility deficits, it is recommended that the patient have 2-3 sessions per week of Physical Therapy at d/c to increase the patient's independence.

## 2022-11-04 NOTE — PROGRESS NOTES
PACU FAMILY UPDATE    Called  and discussed the findings and the conduct of the case as well as post-op course expectations. All questions were answered.

## 2022-11-04 NOTE — ROUTINE PROCESS
TRANSFER - IN REPORT:    Verbal report received from [id number], RN (name) on [facility]  being received from PACU (unit) for routine post - op      Report consisted of patients Situation, Background, Assessment and   Recommendations(SBAR). Information from the following report(s) SBAR, Kardex, Intake/Output, MAR, and Recent Results was reviewed with the receiving nurse. Opportunity for questions and clarification was provided. Assessment completed upon patients arrival to unit and care assumed.

## 2022-11-04 NOTE — H&P
Assessment/Plan  # Detail Type Description    1. Assessment Rectal polyp (K62.1). Impression Patient encounter with Dr. Norah Antnoy and BILL Hankins today. Reviewed MRI and findings with patient, expectations moving forward regarding the lesion/mass/polyp that was found in rectum and unable to be removed during Colonoscopy. Discussion included but not limited to surgical removal, staging of mass/tumor, differentiation between a polyp and a mass, surgical options, risks and concerns, and the incorporation of Dr. Migdalia Baires, Oncology specialist from [facility]. 15. Patient will return with a decision on removal once she speaks with her  and reviews the information provided today. Plan:     Plan: CEA and MRI Pelvis with contrast [facility]) with rectal CA protocol - ASAP (test scheduled for 10/2/22). Return on 10/10 to discuss MRI results and patient plan. .    Provider Plan Consult sent to Dr. Migdalia Baires     E&M Coding based on Time for Today's Encounter: Total time includes reviewing all chart notes, results, correspondence, and reports included in the pre-visit portion of today's visit. Total Time also includes reviewing the history from the patient and others, physical examination, any and all discussion with the patient, counseling, education, planning, ordering referrals, time spent documenting during today's visit. Post visit time is also included in the DOS, which includes documenting, reviewing and communicating results with other Providers, interpreting test results, and care coordination. Total Time = 45 min. 2. Assessment Abnormal finding on diagnostic imaging of other part of digestive tract (R93.3). 3. Assessment Body mass index (BMI) 32.0-32.9, adult (B80.38). Plan Orders Today's instructions / counseling include(s) Lifestyle education regarding diet. 4. Other Orders Orders not associated to today's assessments. Plan Orders Meme Tijerina MD -Oncology.  Clinical information/comments: ASAP appt needed. This 65year old female presents for [facility] [address] and Chart Review. History of Present Illness  1. LARGE RECTAL MASS      Comments: Patient presented today with a referral from Dr. Lukas Hankins - she had a Colonoscopy on 07/13/22. Results show a 7 mm flat polyp that was removed by polypectomy and diverticulosis. Another polyp/mass found in the rectum \"immediately behind the first haustral fold, at about 8-10 cm, there was a large, mucus coated, soft, lumen filling, prolapsing mass with broad base - not felt to be amenable to complete polypectomy. \" Rectum distal to lesion/mass normal. Photographed documented location noted on C-Scope report. Pathology: 07/13/22  Final DX: Sigmoid Polyp - Hyperplastic polyp  Rectal Polyp Biopsy: \"traditional\" serrated adenoma - no high grade dysplasia or malignancy. 2.  Chart Review       Comments: Anatomical Region Laterality Modality  Pelvis -- Magnetic Resonance    Impression      1. Stage: T1/2 N0 MFR-. This lesion appears completely intraluminal, possibly representing an adenomatous polyp. Nonetheless, recommend correlation with any recent sigmoidoscopy/colonoscopy for direct visualization, and biopsy for definitive histologic diagnosis. 2. Maximum EMD of tumor invasion is: 0 mm. 3. MRF involvement:  [Clear > 2 mm]     Minimum tumor to MRF distance is: 4.   4. Sphincter involvement: No.   5. Mesorectal nodes/tumor deposits: Negative . 6. Suspicious extra mesorectal nodes: Negative   7. EMVI: Negative   8. Additional comments: None.      I discussed these findings with the referring clinician, Bobby Padron NP, at 10/3/2022 12:13 PM   .           Signed By: Inez Leyden, MD on 10/3/2022 12:13 PM  Narrative    MRI Rectal Cancer Staging     Indication/clinical history: ; D12.8: Benign neoplasm of rectum; ;   ---------------------------------------------     Imaging Procedure:     MRI imaging was performed using standard institutional rectal cancer protocol. Magnet:  1.5 T   3 T   Contrast:  20 cc Dotarem     120 cc rectal gel; vaginal gel   Image quality: Diagnostic     Comparison:   ------------------------------------------     TUMOR LOCATION AND CHARACTERISTICS:     Tumor location from anal verge:  11.6  Mid (10-15 cm)   Anal verge to distal tumor margin: 11.6 cm   Tumor at or below the puborectalis sling: No   Distance of lowest tumor margin from top of anal sphincter: 9.3 cm   Relationship to the anterior peritoneal reflection: straddles   Craniocaudal length of tumor: 2.8 cm   Clock face of tumor: 5 o'clock to 7 o'clock   Morphology: Polypoid   Mucinous: Possibly minimal mucin     ---------------------------------------------------------------     EXTRAMURAL DEPTH OF INVASION/ MR T-CATEGORY:     Extramural depth of invasion( use 0 mm for T1 or T2 tumor):  0 mm     T-category: T1/T2     For low rectal tumors:       Invasion of anal sphincter complex: Absent     ------------------------------------------------------------------     RELATIONSHIP TO MESORECTAL FASCIA:     Shortest distance of 4 mm of tumor border to MRF is at 9 o'clock.      (Series 5, image 27)     MRF clear (greater than or equal to 2 mm)     ---------------------------------------------------------------------------     EXTRAMURAL VENOUS INVASION:     EMVI: Negative     ---------------------------------------------------------------------------     TUMOR DEPOSITS: Negative       -----------------------------------------------------------------------------     MESORECTAL LYMPH NODES:     Negative     N+ (short axis >9 mm)   N+ (short axis 5-9 mm AND at least 2 morphologic criteria)   N+  (short axis < 5mm AND all 3 morpholpgic criteria)     *suspicious morphologic criteria:round shape, irregular borders, heterogeneous signal intensity     Suspicious extramesorectal lymph nodes: Negative   If yes, location and laterality:     Is the MARIUSZ node station in the field of view: No       ---------------------------------------------------------------------------------     Other findings:     Sigmoid diverticulosis. Rectum is moderately redundant/tortuous. Hysterectomy. No adnexal masses. Several small and top normal bilateral inguinal lymph nodes, likely reactive in nature.     --------------------------------------------------------------------------------------  Procedure Note    Marvin Ray MD - 10/03/2022   Formatting of this note might be different from the original.   MRI Rectal Cancer Staging     Indication/clinical history: ; D12.8: Benign neoplasm of rectum; ;   ---------------------------------------------     Imaging Procedure:     MRI imaging was performed using standard institutional rectal cancer protocol. Magnet:  1.5 T   3 T   Contrast:  20 cc Dotarem     120 cc rectal gel; vaginal gel   Image quality: Diagnostic     Comparison:   ------------------------------------------     TUMOR LOCATION AND CHARACTERISTICS:     Tumor location from anal verge:  11.6  Mid (10-15 cm)   Anal verge to distal tumor margin: 11.6 cm   Tumor at or below the puborectalis sling: No   Distance of lowest tumor margin from top of anal sphincter: 9.3 cm   Relationship to the anterior peritoneal reflection: straddles   Craniocaudal length of tumor: 2.8 cm   Clock face of tumor: 5 o'clock to 7 o'clock   Morphology: Polypoid   Mucinous: Possibly minimal mucin     ---------------------------------------------------------------     EXTRAMURAL DEPTH OF INVASION/ MR T-CATEGORY:     Extramural depth of invasion( use 0 mm for T1 or T2 tumor):  0 mm     T-category: T1/T2     For low rectal tumors:       Invasion of anal sphincter complex: Absent     ------------------------------------------------------------------     RELATIONSHIP TO MESORECTAL FASCIA:     Shortest distance of 4 mm of tumor border to MRF is at 9 o'clock.      (Series 5, image 27)     MRF clear (greater than or equal to 2 mm)     ---------------------------------------------------------------------------     EXTRAMURAL VENOUS INVASION:     EMVI: Negative     ---------------------------------------------------------------------------     TUMOR DEPOSITS: Negative       -----------------------------------------------------------------------------     MESORECTAL LYMPH NODES:     Negative     N+ (short axis >9 mm)   N+ (short axis 5-9 mm AND at least 2 morphologic criteria)   N+  (short axis < 5mm AND all 3 morpholpgic criteria)     *suspicious morphologic criteria:round shape, irregular borders, heterogeneous signal intensity     Suspicious extramesorectal lymph nodes: Negative   If yes, location and laterality:     Is the MARIUSZ node station in the field of view: No       ---------------------------------------------------------------------------------     Other findings:     Sigmoid diverticulosis. Rectum is moderately redundant/tortuous. Hysterectomy. No adnexal masses. Several small and top normal bilateral inguinal lymph nodes, likely reactive in nature.     --------------------------------------------------------------------------------------     IMPRESSION     1. Stage: T1/2 N0 MFR-. This lesion appears completely intraluminal, possibly representing an adenomatous polyp. Nonetheless, recommend correlation with any recent sigmoidoscopy/colonoscopy for direct visualization, and biopsy for definitive histologic diagnosis. 2. Maximum EMD of tumor invasion is: 0 mm. 3. MRF involvement:  [Clear > 2 mm]     Minimum tumor to MRF distance is: 4.   4. Sphincter involvement: No.   5. Mesorectal nodes/tumor deposits: Negative . 6. Suspicious extra mesorectal nodes: Negative   7. EMVI: Negative   8. Additional comments: None.      I discussed these findings with the referring clinician, Nay Brian NP, at 10/3/2022 12:13 PM   .           Signed By: Delma Langston MD on 10/3/2022 12:13 PM  Exam End: 10/02/22 10:51   Specimen Collected: 10/03/22 11:00 Last Resulted: 10/03/22 12:13  Received From: [address]  Result Received: 10/12/22 09:58      CEA 2.3        Problem List  Problem List reviewed. Problem Description Onset Date Chronic Clinical Status Notes   Breast lump  N     Osteoarthritis of wrist 2014 N     Screening for malignant neoplasm of colon done 2021 N         Past Medical/Surgical History   (Detailed)  Disease/disorder Onset Date Management Date Comments     Hysterectomy 2002      repair lungs       Colonoscopy with possible biopsies and/or polypectomies as needed       Skin disease     Colonic Adenomas:  Negative Exams:  (per pt)         Family History   (Detailed)    Relationship Family Member Name  Age at Death Condition Onset Age Cause of Death       Family history of Cancer, unknown  N       Family history of Diabetes mellitus  N       No family history of (CRC) Colorectal cancer. N     Social History  (Detailed)  Tobacco use reviewed. Preferred language is [place]. Marital Status/Family/Social Support  Marital status:      Tobacco use status: Current non-smoker. Smoking status: Never smoker. Tobacco Screening  Patient has never used tobacco. Patient has not used tobacco in the last 30 days. Patient has not used smokeless tobacco in the last 30 days. Smoking Status  Type Smoking Status Usage Per Day Years Used Pack Years Total Pack Years    Never smoker         Alcohol  There is no history of alcohol use. Type: Beer and wine. consumed rarely. Caffeine  The patient uses caffeine: coffee - 1 cup a day.       Medications (active prior to today)  Medication Instructions Start Date Stop Date Refilled Elsewhere   Lian's Goo  TOPICAL CREAM (G) Apply cream to affected area twice per day 2021   N   ergocalciferol (vitamin D2) 1,250 mcg (50,000 unit) capsule  2022   Y     Patient Status   Completed with information received for patient in a summary of care record. Medication Reconciliation  Medications reconciled today. Medication Reviewed  Adherence Medication Name Sig Desc Elsewhere Status   taking as directed ergocalciferol (vitamin D2) 1,250 mcg (50,000 unit) capsule  Y Verified   taking as directed Beal's Goo  TOPICAL CREAM (G) Apply cream to affected area twice per day N Verified     Allergies  Ingredient Reaction (Severity) Medication Name Comment   ADHESIVE      AMOXICILLIN      AMOXICILLIN Angioedema (mild)       Reviewed, no changes. Review of Systems  System Neg/Pos Details   Constitutional Negative Chills, Fever, Night sweats and Weight loss. ENMT Negative Ear drainage, Hearing loss and Otalgia. Eyes Negative Vision changes. Respiratory Negative Cough, Dyspnea and Known TB exposure. Cardio Negative Chest pain and Claudication. GI Negative Abdominal pain, Constipation, Diarrhea, Nausea and Vomiting.  Negative Dysuria and Hematuria. Endocrine Negative Cold intolerance and Heat intolerance. Neuro Negative Gait disturbance, Headache and Seizures. Psych Negative Insomnia and Impaired judgement. Integumentary Negative Change in shape/size of mole(s) and Skin lesion. MS Negative Joint swelling and Muscle weakness. Hema/Lymph Negative Easy bleeding and Easy bruising. Allergic/Immuno Negative Contact allergy and Environmental allergies. Reproductive Negative Breast discharge, Dysmenorrhea and Vaginal discharge.        Vital Signs   Gynecologic History  Patient is postmenopausal.      Height  Time ft in cm Last Measured Height Position   9:23 AM 5.0 6.00 167.64 10/11/2022 0     Weight/BSA/BMI  Time lb oz kg Context BMI kg/m2 BSA m2   9:23 .00  90.718 dressed with shoes 32.28      Temperature/Pulse/Respiration  Time Temp F Temp C Temp Site Pulse/min Pattern Resp/ min   9:23 AM    81 regular      Pulse Oximetry/FIO2  Time Pulse Ox (Rest %) Pulse Ox (Amb %) O2 Sat O2 L/Min Timing FiO2 % L/min Delivery Method Finger Probe   9:23 AM 96  RA      R Index     Pain Scale  Time Pain Score Method   9:23 AM 0/10 Numeric Pain Intensity Scale     Measured by  Time Measured by   9:23 AM Sharita Hoyos     Physical Exam  Exam Findings Details   Constitutional * Overall appearance - well developed, 3 vital signs seen above. Eyes Normal Conjunctiva - Right: Normal, Left: Normal. Pupil - Right: Normal, Left: Normal.   Ears Normal Inspection - Right: Normal, Left: Normal.   Nose/Mouth/Throat Normal External nose - Normal. Lips/teeth/gums - Normal.   Neck Exam Normal Inspection - Normal.   Respiratory Normal Inspection - Normal. Auscultation - Normal. Effort - Normal.   Cardiovascular Normal Regular rate and rhythm. No murmurs, gallops, or rubs. Vascular Normal Capillary refill - Less than 2 seconds. Abdomen Normal Auscultation - Normal. No abdominal tenderness. No hepatic enlargement. No spleen enlargement. No hernia. Rectal Normal Sphincter - Normal. Fecal occult blood test - Not indicated. Skin Normal Inspection - Normal.   Musculoskeletal Normal Visual overview of all four extremities is normal.   Extremity Normal No edema. Neurological Normal Memory - Normal. Cranial nerves - Cranial nerves II through XII grossly intact. Psychiatric Normal Orientation - Oriented to time, place, person & situation. Appropriate mood and affect. Normal insight. Normal judgment.      Immunizations Entered by History  Date Immunization   12/20/2021 12:00:00 AM SARS-COV-2 (COVID-19) vaccine, mRNA, spike protein, LNP, preservative free, 100 mcg or 50 mcg dose   1/26/2021 12:00:00 AM SARS-COV-2 (COVID-19) vaccine, mRNA, spike protein, LNP, preservative free, 100 mcg or 50 mcg dose   2/26/2021 4:45:54 PM SARS-COV-2 (COVID-19) vaccine, mRNA, spike protein, LNP, preservative free, 30 mcg/0.3mL dose         Medications (added, continued, or stopped this visit)  Start Date Medication Directions PRN Status PRN Reason Instruction Stop Date   08/12/2022 ergocalciferol (vitamin D2) 1,250 mcg (50,000 unit) capsule  N      03/24/2021 Beal's Goo  TOPICAL CREAM (G) Apply cream to affected area twice per day N      To Be Scheduled / Ordered  Status Order Reason Assessment Timeframe Appointment   ordered Referrals: Oncology. Cy Bran MD. Evaluate and treat  D12.8         Referrals  Status Physician Reason Timeframe Appointment   ordered Referrals: Oncology.  Cy Bran MD. Evaluate and treat          Active Patient Care Team Members  Name Contact Agency Type Support Role Relationship Active Date Inactive Date Specialty   Elizabeth Mclean   encounter provider       Angie Hernandez   primary practice provider       [address] [facility]   Emergency Contact Spouse      Kavita Silvestre   encounter provider    Gastroenterology   Murali Rich   Patient provider PCP   [facility]   Murali Rich   primary care provider    Family Pract

## 2022-11-04 NOTE — PERIOP NOTES
TRANSFER - OUT REPORT:    Verbal report given to Federico Hdez RN(name) on Mary Velez  being transferred to [facility](unit) for routine post - op       Report consisted of patients Situation, Background, Assessment and   Recommendations(SBAR). Information from the following report(s) SBAR, OR Summary, Procedure Summary, Intake/Output, and MAR was reviewed with the receiving nurse. Lines:   Peripheral IV 11/04/22 Left;Posterior Hand (Active)   Site Assessment Clean, dry, & intact 11/04/22 1150   Phlebitis Assessment 0 11/04/22 1150   Infiltration Assessment 0 11/04/22 1150   Dressing Status Clean, dry, & intact 11/04/22 1150   Dressing Type Transparent;Tape 11/04/22 1150   Hub Color/Line Status Pink;Patent; Infusing 11/04/22 1150       Peripheral IV 11/04/22 Anterior;Right Forearm (Active)   Site Assessment Clean, dry, & intact 11/04/22 1150   Phlebitis Assessment 0 11/04/22 1150   Infiltration Assessment 0 11/04/22 1150   Dressing Status Clean, dry, & intact 11/04/22 1150   Dressing Type Transparent;Tape 11/04/22 1150   Hub Color/Line Status Pink;Patent; Infusing 11/04/22 1150        Opportunity for questions and clarification was provided.       Patient transported with:   O2 @ 2 liters  Registered Nurse

## 2022-11-04 NOTE — ANESTHESIA POSTPROCEDURE EVALUATION
Procedure(s):  TRANSANAL EXCISION MINIMALLY INVASIVE SURGERY (TAMIS)RESECTION OF RECTAL MASS,  CYSTOSCOPY WITH BILATERAL URETERAL CATHETER PLACEMENT AND INJECTION  **ERAS**.    general    Anesthesia Post Evaluation      Multimodal analgesia: multimodal analgesia used between 6 hours prior to anesthesia start to PACU discharge  Patient location during evaluation: PACU  Patient participation: complete - patient participated  Level of consciousness: awake and alert  Pain score: 0  Pain management: adequate  Airway patency: patent  Anesthetic complications: no  Cardiovascular status: acceptable  Respiratory status: acceptable  Hydration status: acceptable  Comments: Awake, able to speak well.    Post anesthesia nausea and vomiting:  none  Final Post Anesthesia Temperature Assessment:  Normothermia (36.0-37.5 degrees C)      INITIAL Post-op Vital signs:   Vitals Value Taken Time   /79 11/04/22 1217   Temp 36.8 °C (98.2 °F) 11/04/22 1217   Pulse 93 11/04/22 1217   Resp 14 11/04/22 1217   SpO2 97 % 11/04/22 1217

## 2022-11-04 NOTE — PERIOP NOTES
TRANSFER - IN REPORT:    Verbal report received from Bhargav Elam CRNA and SHEILA Martinez(name) on [facility] [place]  being received from OR(unit) for routine post - op      Report consisted of patients Situation, Background, Assessment and   Recommendations(SBAR). Information from the following report(s) SBAR, OR Summary, Procedure Summary, Intake/Output, and MAR was reviewed with the receiving nurse. Opportunity for questions and clarification was provided. Assessment completed upon patients arrival to unit and care assumed.

## 2022-11-04 NOTE — PERIOP NOTES
Reviewed PTA medication list with patient/caregiver and patient/caregiver denies any additional medications. Patient admits to having a responsible adult care for them at home for at least 24 hours after surgery. Patient encouraged to use gown warming system and informed that using said warming gown to regulate body temperature prior to a procedure has been shown to help reduce the risks of blood clots and infection. Patient's pharmacy of choice verified and documented in PTA medication section. Dual skin assessment & fall risk band verification completed with Sury Leiva RN.

## 2022-11-04 NOTE — PROGRESS NOTES
Transition of Care (NENO) Plan:     chart reviewed and called patients room to discuss PT recommendation for home health services for discharge, male answered patients phone and identified self as patient's spouse,spouse informed cm that pt was sleeping, cm introduced self and explained role as d/c planner, informed spouse that case management will follow up with pt tomorrow. Per chart pt was admitted for surgery for rectal mass removal, Physical therapy has seen pt and recommended home health services, cm will update weekend cm to speak with pt to discuss discharge plan options. NENO Transportation:   How is patient being transported at discharge? Family/Friend      When? Once discharge criteria met     Is transport scheduled? N/A      Follow-up appointment and transportation:   PCP/Specialist?  See AVS for Appointment         Who is transporting to the follow-up appointment? Family/Friend      Is transport for follow up appointment scheduled? N/A    Communication plan (with patient/family): Who is being called? Patient or Next of Kin? Responsible party? Patient      What number(s) is to be used? See Facesheet      What service provider is calling for [facility] services? When are they calling? 24-48 hours following discharge    Readmission Risk? (Green/Low; Yellow/Moderate; Red/High):  Green   Care Management Interventions  PCP Verified by CM:  Yes  Palliative Care Criteria Met (RRAT>21 & CHF Dx)?: No  Mode of Transport at Discharge:  (family)  Transition of Care Consult (CM Consult): Home Health  Physical Therapy Consult: Yes  Occupational Therapy Consult: Yes  Support Systems: Spouse/Significant Other  Confirm Follow Up Transport: Family  Discharge Location  Patient Expects to be Discharged to[de-identified] Home with home health

## 2022-11-04 NOTE — PROGRESS NOTES
Comprehensive Nutrition Assessment    Type and Reason for Visit: Initial, Consult    Nutrition Recommendations/Plan:   Advance diet per MD when clinically feasible to better meet nutritional needs  Will order ensure clear TID as pt is on clear liquids- recommend discontinuing ensure clear once advanced past clears. Will order Ozzie 1x daily to support nutritional needs. Malnutrition Assessment:  Malnutrition Status: At risk for malnutrition (Clear liquids) (11/04/22 1312)      Nutrition Assessment:    Admitted for rectal mass s/p TRANSANAL EXCISION MINIMALLY INVASIVE SURGERY (TAMIS)RESECTION OF RECTAL MASS, CYSTOSCOPY WITH BILATERAL URETERAL CATHETER PLACEMENT AND INJECTION 10/4. Consulted for ERAS/Wound. Weight hx: 203lb (10/11/22), 204lb (7/15/22), 196lb (6/21/21); no significant weight lost noted. Clear liquids ordered. Pt was sleep during visit. Family at bedside. Family reported pt did not try any clear liquids yet but will try to have something later. Informed about Nancy Izquierdo for dinner- encouraged to mix with liquid if choice to support wound healing. Nutrition Related Findings:    Labs and meds - D5%-0.45% NaCl w/ KCl @ 125ml/hr, lactated ringers. BM 11/4- watery loose. Wound Type: Surgical incision    Current Nutrition Intake & Therapies:  Average Meal Intake: Unable to assess  Average Supplement Intake: Unable to assess  ADULT DIET Clear Liquid  ADULT ORAL NUTRITION SUPPLEMENT Breakfast, Lunch, Dinner; Standard High Calorie/High Protein    Anthropometric Measures:  Height: 5' 6\" (167.6 cm)  Ideal Body Weight (IBW): 130 lbs (59 kg)  Admission Body Weight: 201 lb 11.5 oz  Current Body Wt:  91.5 kg (201 lb 11.5 oz), 155.2 % IBW. Standing scale  Current BMI (kg/m2): 32.6  Usual Body Weight: 92.1 kg (203 lb) (10/11/2022)  % Weight Change (Calculated): -0.6  Weight Adjustment: No adjustment                 BMI Category: Obese class 1 (BMI 30.0-34. 9)    Estimated Daily Nutrient Needs:  Energy Requirements Based On: Formula  Weight Used for Energy Requirements: Current (MSJ x1.2-1.3)  Energy (kcal/day): 9631-4432  Weight Used for Protein Requirements: Current  Protein (g/day): 92  Method Used for Fluid Requirements: 1 ml/kcal  Fluid (ml/day): 9846-1184    Nutrition Diagnosis:   Inadequate energy intake related to acute injury/trauma as evidenced by NPO or clear liquid status due to medical condition    Nutrition Interventions:   Food and/or Nutrient Delivery: Continue current diet, Continue oral nutrition supplement, Start oral nutrition supplement  Nutrition Education/Counseling: No recommendations at this time  Coordination of Nutrition Care: Continue to monitor while inpatient       Goals:     Goals: PO intake 50% or greater, by next RD assessment       Nutrition Monitoring and Evaluation:   Behavioral-Environmental Outcomes: None identified  Food/Nutrient Intake Outcomes: Food and nutrient intake, Diet advancement/tolerance, Supplement intake  Physical Signs/Symptoms Outcomes: Biochemical data, Meal time behavior, Skin, Weight, GI status, Nutrition focused physical findings    Discharge Planning:    No discharge needs at this time    Jett Hernández RD

## 2022-11-04 NOTE — ANESTHESIA PREPROCEDURE EVALUATION
Relevant Problems   No relevant active problems       Anesthetic History   No history of anesthetic complications            Review of Systems / Medical History  Patient summary reviewed, nursing notes reviewed and pertinent labs reviewed    Pulmonary  Within defined limits                 Neuro/Psych   Within defined limits           Cardiovascular  Within defined limits                Exercise tolerance: >4 METS     GI/Hepatic/Renal  Within defined limits              Endo/Other  Within defined limits           Other Findings              Physical Exam    Airway  Mallampati: III  TM Distance: > 6 cm  Neck ROM: normal range of motion, short neck   Mouth opening: Normal     Cardiovascular  Regular rate and rhythm,  S1 and S2 normal,  no murmur, click, rub, or gallop  Rhythm: regular  Rate: normal         Dental  No notable dental hx       Pulmonary  Breath sounds clear to auscultation               Abdominal  GI exam deferred       Other Findings            Anesthetic Plan    ASA: 2  Anesthesia type: general          Induction: Intravenous  Anesthetic plan and risks discussed with: Patient

## 2022-11-04 NOTE — PROGRESS NOTES
701 Immanuel Blake care of patient at this time. Patient AAOx3. PIV is C/D/I with fluids infusing, no s/s of infiltration or phlebitis. VSS on RA. Breath sounds clear bilaterally. Patient reaching 1000 on IS. Rectal packing intact, no s/s of infection present. Bowel sounds active, abdomen soft and non-tender. SCDs intact to BLE. No other concerns at this time. Possessions and call bell within reach, will continue to monitor. 1234  Patient states pain 7/10, meds given per mar.   PT in to see patient  Patient ambulating in hallway. [phone]  This RN encouraged patient to ambulate hallway. Patient refused, saying she wanted to ambulate before going to bed instead. Education provided on importance of ambulating in the hallway and sitting up in the chair for meals. 1845  400mL green urine emptied from urinary lovelace bag.

## 2022-11-04 NOTE — PROGRESS NOTES
19:15 Assessment completed. . Lungs are clear bilat. IS = 2500. Belching but not passin gas. Rectal packing remains in place. Garcia is patent with brownish urine. 20:50 Ambulated in the hallway to M/B door & back again. Steady on ft with 0 dizziness. 22:30 Shift assessment completed. See [facility] flow sheet for details. 01:00 Transferred to room 326 per supervisor    02:55 Reassessed with 0 changes noted. Resting quietly in bed with eyes closed between cares. 07:15 Bedside and Verbal shift change report given to 3280 Jose Plata (oncoming nurse) by Poonam Sanchez RN (offgoing nurse). Report included the following information SBAR.

## 2022-11-04 NOTE — ROUTINE PROCESS
Bedside and Verbal shift change report given to Stuart Razo RN (oncoming nurse) by Jaimee Chen RN (offgoing nurse). Report included the following information SBAR, Kardex, Intake/Output, MAR, and Recent Results.

## 2022-11-04 NOTE — BRIEF OP NOTE
Brief Postoperative Note    Patient: Liberty Herring  YOB: 1960  MRN: [id number]    Date of Procedure: 11/4/2022     Pre-Op Diagnosis: RECTAL MASS    Post-Op Diagnosis: Same as preoperative diagnosis. Procedure(s):  TRANSANAL EXCISION MINIMALLY INVASIVE SURGERY (TAMIS)RESECTION OF RECTAL MASS,  CYSTOSCOPY WITH BILATERAL URETERAL CATHETER PLACEMENT AND INJECTION  **ERAS**    Surgeon(s):  Adore Coleman DO    Surgical Assistant: Surg Asst-1: Gal Reynodls    Anesthesia: General     Estimated Blood Loss (mL): Minimal    Complications: None    Specimens:   ID Type Source Tests Collected by Time Destination   1 : Rectal Mass Preservative Rectal  Adore Coleman DO 11/4/2022 0921 Pathology        Implants: * No implants in log *    Drains:   [REMOVED] Orogastric Tube 11/04/22 (Removed)       Findings: right posterolateral behind valve at 10-12cm from verge.  4cm    Electronically Signed by Ki Zamora DO on 11/4/2022 at 11:38 AM

## 2022-11-05 VITALS
WEIGHT: 213 LBS | RESPIRATION RATE: 16 BRPM | OXYGEN SATURATION: 98 % | TEMPERATURE: 97.9 F | HEART RATE: 99 BPM | SYSTOLIC BLOOD PRESSURE: 101 MMHG | BODY MASS INDEX: 34.23 KG/M2 | HEIGHT: 66 IN | DIASTOLIC BLOOD PRESSURE: 52 MMHG

## 2022-11-05 LAB
ANION GAP SERPL CALC-SCNC: 4 MMOL/L (ref 3–18)
BASOPHILS # BLD: 0 K/UL (ref 0–0.1)
BASOPHILS NFR BLD: 0 % (ref 0–2)
BUN SERPL-MCNC: 5 MG/DL (ref 7–18)
BUN/CREAT SERPL: 6 (ref 12–20)
CALCIUM SERPL-MCNC: 8.4 MG/DL (ref 8.5–10.1)
CHLORIDE SERPL-SCNC: 113 MMOL/L (ref 100–111)
CO2 SERPL-SCNC: 28 MMOL/L (ref 21–32)
CREAT SERPL-MCNC: 0.82 MG/DL (ref 0.6–1.3)
DIFFERENTIAL METHOD BLD: ABNORMAL
EOSINOPHIL # BLD: 0 K/UL (ref 0–0.4)
EOSINOPHIL NFR BLD: 0 % (ref 0–5)
ERYTHROCYTE [DISTWIDTH] IN BLOOD BY AUTOMATED COUNT: 13.2 % (ref 11.6–14.5)
GLUCOSE SERPL-MCNC: 119 MG/DL (ref 74–99)
HCT VFR BLD AUTO: 34.4 % (ref 35–45)
HGB BLD-MCNC: 11.5 G/DL (ref 12–16)
IMM GRANULOCYTES # BLD AUTO: 0 K/UL (ref 0–0.04)
IMM GRANULOCYTES NFR BLD AUTO: 0 % (ref 0–0.5)
LYMPHOCYTES # BLD: 1.9 K/UL (ref 0.9–3.6)
LYMPHOCYTES NFR BLD: 23 % (ref 21–52)
MAGNESIUM SERPL-MCNC: 2 MG/DL (ref 1.6–2.6)
MCH RBC QN AUTO: 29.1 PG (ref 24–34)
MCHC RBC AUTO-ENTMCNC: 33.4 G/DL (ref 31–37)
MCV RBC AUTO: 87.1 FL (ref 78–100)
MONOCYTES # BLD: 0.7 K/UL (ref 0.05–1.2)
MONOCYTES NFR BLD: 8 % (ref 3–10)
NEUTS SEG # BLD: 5.8 K/UL (ref 1.8–8)
NEUTS SEG NFR BLD: 69 % (ref 40–73)
NRBC # BLD: 0 K/UL (ref 0–0.01)
NRBC BLD-RTO: 0 PER 100 WBC
PHOSPHATE SERPL-MCNC: 2.8 MG/DL (ref 2.5–4.9)
PLATELET # BLD AUTO: 197 K/UL (ref 135–420)
PMV BLD AUTO: 10.9 FL (ref 9.2–11.8)
POTASSIUM SERPL-SCNC: 3.6 MMOL/L (ref 3.5–5.5)
RBC # BLD AUTO: 3.95 M/UL (ref 4.2–5.3)
SODIUM SERPL-SCNC: 145 MMOL/L (ref 136–145)
WBC # BLD AUTO: 8.4 K/UL (ref 4.6–13.2)

## 2022-11-05 PROCEDURE — 74011250636 HC RX REV CODE- 250/636: Performed by: SURGERY

## 2022-11-05 PROCEDURE — 36415 COLL VENOUS BLD VENIPUNCTURE: CPT

## 2022-11-05 PROCEDURE — 80048 BASIC METABOLIC PNL TOTAL CA: CPT

## 2022-11-05 PROCEDURE — 83735 ASSAY OF MAGNESIUM: CPT

## 2022-11-05 PROCEDURE — 74011000250 HC RX REV CODE- 250: Performed by: SURGERY

## 2022-11-05 PROCEDURE — 85025 COMPLETE CBC W/AUTO DIFF WBC: CPT

## 2022-11-05 PROCEDURE — 74011000258 HC RX REV CODE- 258: Performed by: SURGERY

## 2022-11-05 PROCEDURE — 84100 ASSAY OF PHOSPHORUS: CPT

## 2022-11-05 PROCEDURE — 74011250637 HC RX REV CODE- 250/637: Performed by: SURGERY

## 2022-11-05 RX ORDER — ACETAMINOPHEN 500 MG
1000 TABLET ORAL 3 TIMES DAILY
Qty: 20 TABLET | Refills: 0 | Status: SHIPPED | OUTPATIENT
Start: 2022-11-05

## 2022-11-05 RX ADMIN — SODIUM CHLORIDE, PRESERVATIVE FREE 10 ML: 5 INJECTION INTRAVENOUS at 05:21

## 2022-11-05 RX ADMIN — METRONIDAZOLE 500 MG: 500 INJECTION, SOLUTION INTRAVENOUS at 09:31

## 2022-11-05 RX ADMIN — POTASSIUM CHLORIDE, DEXTROSE MONOHYDRATE AND SODIUM CHLORIDE 125 ML/HR: 150; 5; 450 INJECTION, SOLUTION INTRAVENOUS at 06:21

## 2022-11-05 RX ADMIN — ACETAMINOPHEN 1000 MG: 500 TABLET ORAL at 06:20

## 2022-11-05 RX ADMIN — KETOROLAC TROMETHAMINE 30 MG: 30 INJECTION, SOLUTION INTRAMUSCULAR; INTRAVENOUS at 11:42

## 2022-11-05 RX ADMIN — SODIUM CHLORIDE, PRESERVATIVE FREE 10 ML: 5 INJECTION INTRAVENOUS at 13:41

## 2022-11-05 RX ADMIN — KETOROLAC TROMETHAMINE 30 MG: 30 INJECTION, SOLUTION INTRAMUSCULAR; INTRAVENOUS at 00:39

## 2022-11-05 RX ADMIN — KETOROLAC TROMETHAMINE 30 MG: 30 INJECTION, SOLUTION INTRAMUSCULAR; INTRAVENOUS at 05:21

## 2022-11-05 RX ADMIN — CEFTRIAXONE 2 G: 1 INJECTION, POWDER, FOR SOLUTION INTRAMUSCULAR; INTRAVENOUS at 05:19

## 2022-11-05 NOTE — PROGRESS NOTES
CM met with pt and her  at bedside to discuss care transition and recommendations. Pt/family do not feel HH is needed. Anticipate pt will transition home with physician follow up with in the next 24 hours. Pt's  will transport pt home upon discharge. Care Management Interventions  PCP Verified by CM: Yes  Palliative Care Criteria Met (RRAT>21 & CHF Dx)?: No  Mode of Transport at Discharge:  Other (see comment) (Family )  Transition of Care Consult (CM Consult): Discharge Planning  Health Maintenance Reviewed: Yes  Physical Therapy Consult: Yes  Occupational Therapy Consult: Yes  Support Systems: Spouse/Significant Other  Confirm Follow Up Transport: Family  The Plan for Transition of Care is Related to the Following Treatment Goals : Home with physician follow up  Discharge Location  Patient Expects to be Discharged to[de-identified] Home with family assistance

## 2022-11-05 NOTE — PROGRESS NOTES
Problem: Falls - Risk of  Goal: *Absence of Falls  Description: Document Khari Ferrer Fall Risk and appropriate interventions in the flowsheet.   Outcome: Progressing Towards Goal  Note: Fall Risk Interventions:  Mobility Interventions: Communicate number of staff needed for ambulation/transfer, Patient to call before getting OOB         Medication Interventions: Assess postural VS orthostatic hypotension, Patient to call before getting OOB, Teach patient to arise slowly    Elimination Interventions: Call light in reach, Patient to call for help with toileting needs

## 2022-11-05 NOTE — PROGRESS NOTES
Problem: Falls - Risk of  Goal: *Absence of Falls  Description: Document Sofia Fothergill Fall Risk and appropriate interventions in the flowsheet.   Outcome: Progressing Towards Goal  Note: Fall Risk Interventions:  Mobility Interventions: Communicate number of staff needed for ambulation/transfer, Patient to call before getting OOB         Medication Interventions: Assess postural VS orthostatic hypotension, Patient to call before getting OOB, Teach patient to arise slowly    Elimination Interventions: Call light in reach, Patient to call for help with toileting needs              Problem: Patient Education: Go to Patient Education Activity  Goal: Patient/Family Education  Outcome: Progressing Towards Goal     Problem: Pain  Goal: *Control of Pain  Outcome: Progressing Towards Goal

## 2022-11-05 NOTE — PROGRESS NOTES
1115: Pt with 3 visitors and wishes to wait for PT, will follow up again. 1331: Pt asleep, did not arouse, will follow up as schedule permits.

## 2022-11-05 NOTE — DISCHARGE SUMMARY
Physician Discharge Summary     Patient ID:  Estrellita Jean  [id number]  65 y.o.  1960    Allergies: Penicillins, Adhesive tape-silicones, and Amoxicillin    Admit Date: 11/4/2022    Discharge Date: 11/5/2022    * Admission Diagnoses: Rectal mass [K62.89]    * Discharge Diagnoses:    Hospital Problems as of 11/5/2022 Date Reviewed: 11/4/2022            Codes Class Noted - Resolved POA    Rectal mass ICD-10-CM: K62.89  ICD-9-CM: 787.99  11/4/2022 - Present Unknown            Admission Condition: Fair    * Discharge Condition: improved    * Procedures: Procedure(s):  TRANSANAL EXCISION MINIMALLY INVASIVE SURGERY (TAMIS)RESECTION OF RECTAL MASS,  CYSTOSCOPY WITH BILATERAL URETERAL CATHETER PLACEMENT AND INJECTION  **ERAS**    * Hospital Course:   Normal hospital course for this procedure. Consults: None    Significant Diagnostic Studies: CBC    * Disposition: Home    Discharge Medications:   Current Discharge Medication List        START taking these medications    Details   acetaminophen (TYLENOL) 500 mg tablet Take 2 Tablets by mouth three (3) times daily. Indications: pain  Qty: 20 Tablet, Refills: 0  Start date: 11/5/2022           CONTINUE these medications which have NOT CHANGED    Details   alendronate (FOSAMAX) 70 mg tablet Take 70 mg by mouth every seven (7) days. calcium carbonate 500 mg calcium (1,250 mg) chewable tablet Take 1 Tablet by mouth daily. cholecalciferol, vitamin D3, (VITAMIN D3) 1,250 mcg (50,000 unit) tablet Take 50,000 Units by mouth.      ergocalciferol (ERGOCALCIFEROL) 1,250 mcg (50,000 unit) capsule Take 50,000 Units by mouth.      methylPREDNISolone (MEDROL, LISY,) 4 mg tablet Per dose pack instructions  Qty: 1 Package, Refills: 0      HYDROcodone-acetaminophen (NORCO)  mg tablet Take 1 Tab by mouth every six (6) hours as needed for Pain. Max Daily Amount: 4 Tabs. Qty: 20 Tab, Refills: 0             * Follow-up Care/Patient Instructions:   Activity: Activity as tolerated  Diet: Regular Diet  Wound Care: None needed    Follow-up Information       Follow up With Specialties Details Why Contact Info    Colton Abebe,  Colon and Rectal Surgery Follow up Post op appointment already scheduled and provided to patient prior to surgery.  [address] [place] [address]  [phone], Janis Morgan MD Family Medicine Follow up  [address]  [phone]            Follow-up tests/labs none    Signed:  Cady Welsh MD  11/5/2022  3:29 PM

## 2022-11-05 NOTE — PROGRESS NOTES
POD#1  No c/o. Arline PO, no n/v, +BM w some clot/packing this am  AVSS    RRR  CTA  S/ND/NT  No calf tender    H/H 11.5/34.4    Stable s/p TAMIS anal polyp  PO as tolerated  D/C to home  F/U w Dr. Flynn Robertson in 2-3 weeks.

## 2024-08-05 ENCOUNTER — ANESTHESIA EVENT (OUTPATIENT)
Facility: HOSPITAL | Age: 64
End: 2024-08-05
Payer: OTHER GOVERNMENT

## 2024-08-05 ENCOUNTER — HOSPITAL ENCOUNTER (OUTPATIENT)
Facility: HOSPITAL | Age: 64
Setting detail: OUTPATIENT SURGERY
Discharge: HOME | End: 2024-08-05
Attending: INTERNAL MEDICINE | Admitting: INTERNAL MEDICINE
Payer: OTHER GOVERNMENT

## 2024-08-05 ENCOUNTER — ANESTHESIA (OUTPATIENT)
Facility: HOSPITAL | Age: 64
End: 2024-08-05
Payer: OTHER GOVERNMENT

## 2024-08-05 VITALS
SYSTOLIC BLOOD PRESSURE: 106 MMHG | DIASTOLIC BLOOD PRESSURE: 70 MMHG | HEART RATE: 96 BPM | BODY MASS INDEX: 34.28 KG/M2 | RESPIRATION RATE: 15 BRPM | OXYGEN SATURATION: 98 % | TEMPERATURE: 97 F | HEIGHT: 66 IN | WEIGHT: 213.3 LBS

## 2024-08-05 PROBLEM — Z86.010 PERSONAL HISTORY OF COLONIC POLYPS: Status: ACTIVE | Noted: 2024-08-05

## 2024-08-05 PROCEDURE — 6360000002 HC RX W HCPCS: Performed by: NURSE ANESTHETIST, CERTIFIED REGISTERED

## 2024-08-05 PROCEDURE — 2500000003 HC RX 250 WO HCPCS: Performed by: NURSE ANESTHETIST, CERTIFIED REGISTERED

## 2024-08-05 PROCEDURE — 2580000003 HC RX 258: Performed by: INTERNAL MEDICINE

## 2024-08-05 PROCEDURE — 3600007502: Performed by: INTERNAL MEDICINE

## 2024-08-05 PROCEDURE — 7100000011 HC PHASE II RECOVERY - ADDTL 15 MIN: Performed by: INTERNAL MEDICINE

## 2024-08-05 PROCEDURE — 2709999900 HC NON-CHARGEABLE SUPPLY: Performed by: INTERNAL MEDICINE

## 2024-08-05 PROCEDURE — 3700000001 HC ADD 15 MINUTES (ANESTHESIA): Performed by: INTERNAL MEDICINE

## 2024-08-05 PROCEDURE — 3600007512: Performed by: INTERNAL MEDICINE

## 2024-08-05 PROCEDURE — 7100000010 HC PHASE II RECOVERY - FIRST 15 MIN: Performed by: INTERNAL MEDICINE

## 2024-08-05 PROCEDURE — 3700000000 HC ANESTHESIA ATTENDED CARE: Performed by: INTERNAL MEDICINE

## 2024-08-05 PROCEDURE — 88305 TISSUE EXAM BY PATHOLOGIST: CPT

## 2024-08-05 RX ORDER — SODIUM CHLORIDE 9 MG/ML
INJECTION, SOLUTION INTRAVENOUS PRN
Status: CANCELLED | OUTPATIENT
Start: 2024-08-05

## 2024-08-05 RX ORDER — SODIUM CHLORIDE 0.9 % (FLUSH) 0.9 %
5-40 SYRINGE (ML) INJECTION EVERY 12 HOURS SCHEDULED
Status: CANCELLED | OUTPATIENT
Start: 2024-08-05

## 2024-08-05 RX ORDER — PROPOFOL 10 MG/ML
INJECTION, EMULSION INTRAVENOUS PRN
Status: DISCONTINUED | OUTPATIENT
Start: 2024-08-05 | End: 2024-08-05 | Stop reason: SDUPTHER

## 2024-08-05 RX ORDER — LIDOCAINE HYDROCHLORIDE 20 MG/ML
INJECTION, SOLUTION EPIDURAL; INFILTRATION; INTRACAUDAL; PERINEURAL PRN
Status: DISCONTINUED | OUTPATIENT
Start: 2024-08-05 | End: 2024-08-05 | Stop reason: SDUPTHER

## 2024-08-05 RX ORDER — CALCIUM CARBONATE 500 MG/1
1 TABLET, CHEWABLE ORAL PRN
COMMUNITY

## 2024-08-05 RX ORDER — SODIUM CHLORIDE 9 MG/ML
INJECTION, SOLUTION INTRAVENOUS CONTINUOUS
Status: DISCONTINUED | OUTPATIENT
Start: 2024-08-05 | End: 2024-08-05 | Stop reason: HOSPADM

## 2024-08-05 RX ORDER — ALENDRONATE SODIUM 70 MG/1
70 TABLET ORAL
COMMUNITY

## 2024-08-05 RX ORDER — NALOXONE HYDROCHLORIDE 0.4 MG/ML
INJECTION, SOLUTION INTRAMUSCULAR; INTRAVENOUS; SUBCUTANEOUS PRN
Status: CANCELLED | OUTPATIENT
Start: 2024-08-05

## 2024-08-05 RX ORDER — SODIUM CHLORIDE 0.9 % (FLUSH) 0.9 %
5-40 SYRINGE (ML) INJECTION PRN
Status: CANCELLED | OUTPATIENT
Start: 2024-08-05

## 2024-08-05 RX ADMIN — SODIUM CHLORIDE: 9 INJECTION, SOLUTION INTRAVENOUS at 14:17

## 2024-08-05 RX ADMIN — LIDOCAINE HYDROCHLORIDE 80 MG: 20 INJECTION, SOLUTION EPIDURAL; INFILTRATION; INTRACAUDAL; PERINEURAL at 15:08

## 2024-08-05 RX ADMIN — PROPOFOL 50 MG: 10 INJECTION, EMULSION INTRAVENOUS at 15:14

## 2024-08-05 RX ADMIN — PROPOFOL 50 MG: 10 INJECTION, EMULSION INTRAVENOUS at 15:17

## 2024-08-05 RX ADMIN — PROPOFOL 50 MG: 10 INJECTION, EMULSION INTRAVENOUS at 15:12

## 2024-08-05 RX ADMIN — PROPOFOL 50 MG: 10 INJECTION, EMULSION INTRAVENOUS at 15:10

## 2024-08-05 RX ADMIN — PROPOFOL 100 MG: 10 INJECTION, EMULSION INTRAVENOUS at 15:08

## 2024-08-05 ASSESSMENT — PAIN - FUNCTIONAL ASSESSMENT
PAIN_FUNCTIONAL_ASSESSMENT: FACE, LEGS, ACTIVITY, CRY, AND CONSOLABILITY (FLACC)
PAIN_FUNCTIONAL_ASSESSMENT: 0-10
PAIN_FUNCTIONAL_ASSESSMENT: 0-10

## 2024-08-05 NOTE — ANESTHESIA POSTPROCEDURE EVALUATION
Post-Anesthesia Evaluation & Assessment    Vitals  BP: (!) 93/59  Temp: 97 °F (36.1 °C)  Temp Source: Tympanic  Pulse: 89  Respirations: 14  SpO2: 97 %  Height: 167.6 cm (5' 6\")  Weight - Scale: 96.8 kg (213 lb 4.8 oz)  Pain Level: 0    Nausea/Vomiting: Controlled.    Post-operative hydration adequate.    Pain managed.    Mental status & Level of consciousness: alert and oriented x 3    Neurological status: moves all extremities, sensation grossly intact    Pulmonary status: airway patent, adequate oxygenation.    Complications related to anesthesia: none    Patient has met all PACU discharge requirements.      Yeison Arevalo, DO

## 2024-08-05 NOTE — ANESTHESIA PRE PROCEDURE
Department of Anesthesiology  Preprocedure Note       Name:  Lucrecia Gallegos   Age:  64 y.o.  :  1960                                          MRN:  [id number]         Date:  2024      Surgeon: Surgeon(s):  Ye Santos MD    Procedure: Procedure(s):  COLONOSCOPY    Medications prior to admission:   Prior to Admission medications    Medication Sig Start Date End Date Taking? Authorizing Provider   acetaminophen (TYLENOL) 500 MG tablet Take 1,000 mg by mouth 3 times daily 22   Automatic Reconciliation, Ar   alendronate (FOSAMAX) 70 MG tablet Take 70 mg by mouth every 7 days 10/11/22 10/11/23  Automatic Reconciliation, Ar   calcium carbonate (OS-STORMY) 1250 (500 Ca) MG chewable tablet Take 1 tablet by mouth daily 22  Automatic Reconciliation, Ar   Cholecalciferol 1.25 MG (52142 UT) TABS Take 50,000 Units by mouth 10/11/22 10/11/23  Automatic Reconciliation, Ar   ergocalciferol (ERGOCALCIFEROL) 1.25 MG (26385 UT) capsule Take 50,000 Units by mouth 7/18/22 10/12/23  Automatic Reconciliation, Ar   HYDROcodone-acetaminophen (NORCO)  MG per tablet Take 1 tablet by mouth every 6 hours as needed. 3/25/15   Automatic Reconciliation, Ar   methylPREDNISolone (MEDROL DOSEPACK) 4 MG tablet Per dose pack instructions 3/25/15   Automatic Reconciliation, Ar       Current medications:    No current facility-administered medications for this encounter.       Allergies:    Allergies   Allergen Reactions   • Penicillins Hives and Swelling     allergic to any cillins     • Adhesive Tape Other (See Comments)     Skin irritation can use paper tape       Problem List:    Patient Active Problem List   Diagnosis Code   • Rectal mass K62.89       Past Medical History:  History reviewed. No pertinent past medical history.    Past Surgical History:  History reviewed. No pertinent surgical history.    Social History:    Social History     Tobacco Use   • Smoking status: Not on file   • Smokeless tobacco: Not on

## 2024-08-05 NOTE — BRIEF OP NOTE
Brief Postoperative Note      Patient: Lucrecia Gallegos  YOB: 1960  MRN: [id number]    Date of Procedure: 8/5/2024    Pre-Op Diagnosis: Diarrhea, unspecified type [R19.7]  and follow up villous adenoma s/p surgical resection     Post-Op Diagnosis: diverticulosis, colon polyps        Assistants: None    Anesthesia: Propofol    Estimated Blood Loss (mL): Minimal    Complications: None    Specimens:   ID Type Source Tests Collected by Time Destination   1 : RANDOM COLON BIOPSY Tissue Colon SURGICAL PATHOLOGY Ye Santos MD 8/5/2024 1514    2 : RECTAL POLYPS Tissue Rectum SURGICAL PATHOLOGY Ye Santos MD 8/5/2024 1522        Findings: Same as Post-Op Diagnosis    Electronically signed by Ye Santos MD, FACG on 8/5/2024 at 3:25 PM

## 2024-08-05 NOTE — DISCHARGE INSTRUCTIONS
DISCHARGE SUMMARY from Nurse    PATIENT INSTRUCTIONS:    After general anesthesia or intravenous sedation, for 24 hours or while taking prescription Narcotics:  Limit your activities  Do not drive and operate hazardous machinery  Do not make important personal or business decisions  Do  not drink alcoholic beverages  If you have not urinated within 8 hours after discharge, please contact your surgeon on call.    Report the following to your surgeon:  Excessive pain, swelling, redness or odor of or around the surgical area  Temperature over 100.5  Nausea and vomiting lasting longer than 4 hours or if unable to take medications  Any signs of decreased circulation or nerve impairment to extremity: change in color, persistent  numbness, tingling, coldness or increase pain  Any questions    What to do at Home:  Recommended activity: as above     If you experience any of the following symptoms as above , please follow up with Doctor ULISES .  .    *  Please give a list of your current medications to your Primary Care Provider.    *  Please update this list whenever your medications are discontinued, doses are      changed, or new medications (including over-the-counter products) are added.    *  Please carry medication information at all times in case of emergency situations.    These are general instructions for a healthy lifestyle:    No smoking/ No tobacco products/ Avoid exposure to second hand smoke  Surgeon General's Warning:  Quitting smoking now greatly reduces serious risk to your health.    Obesity, smoking, and sedentary lifestyle greatly increases your risk for illness    A healthy diet, regular physical exercise & weight monitoring are important for maintaining a healthy lifestyle    You may be retaining fluid if you have a history of heart failure or if you experience any of the following symptoms:  Weight gain of 3 pounds or more overnight or 5 pounds in a week, increased swelling in our hands or feet or

## 2024-08-05 NOTE — PERIOP NOTE
Face to face Discharged  instruction given to family and pt and agreed  with the plan. Discharged includes diet, activity limitations , medication to continue and repeat colonoscopy in 5 years. D/c to home in stable condition with care of family.

## 2024-08-05 NOTE — BRIEF OP NOTE
Brief Postoperative Note      Patient: Lucrecia Gallegos  YOB: 1960  MRN: [id number]    Date of Procedure: 8/5/2024    Pre-Op Diagnosis: Diarrhea, unspecified type [R19.7]     Post-Op Diagnosis: polyps        Assistants: None    Anesthesia: Propofol    Estimated Blood Loss (mL): Minimal    Complications: None    Specimens:   ID Type Source Tests Collected by Time Destination   1 : RANDOM COLON BIOPSY Tissue Colon SURGICAL PATHOLOGY Ye Santos MD 8/5/2024 1514    2 : RECTAL POLYPS Tissue Rectum SURGICAL PATHOLOGY Ye Santos MD 8/5/2024 1522        Findings: Same as Post-Op Diagnosis    Electronically signed by Ye Santos MD, FACG on 8/5/2024 at 3:26 PM

## 2024-08-05 NOTE — H&P
H and P reviewed, completed on paper and handed to RN  Pt seen and examined  No interval change  Ye Santos MD, FACG

## 2024-08-06 NOTE — PROCEDURES
[address]  [address]                             PROCEDURE NOTE      PATIENT NAME: JERRY ROMAN                  : 1960  MED REC NO: [id number]                       ROOM: [facility]  ACCOUNT NO: [id number]                       ADMIT DATE: 2024  PROVIDER: Ye Santos MD    DATE OF SERVICE:  2024    PREOPERATIVE DIAGNOSES:  Followup of large masslike villous adenoma, status post surgical resection as well as diarrhea.    POSTOPERATIVE DIAGNOSES:  Small colon polyps x4 and pandiverticular disease.    PROCEDURES PERFORMED:  Colonoscopy, snare polypectomy and biopsy.    SURGEON:  Ye Santos MD    ASSISTANT:  None.    ANESTHESIA:  Propofol.    ESTIMATED BLOOD LOSS:  None.    SPECIMENS REMOVED:  Colon polyps and random colon.    INTRAOPERATIVE FINDINGS:  Colon polyps x4 and pandiverticular disease.     COMPLICATIONS:  None.    IMPLANTS:  None.    INDICATIONS:  hx polyps, diarrhea    DESCRIPTION IN DETAIL:  Procedure colonoscopy with informed consent obtained and placed on the chart.  The patient was placed in the left lateral decubitus position.  Oxygen was administered supplementally.  A mild drowsy state was induced and maintained with a propofol based sedation.  After an unremarkable digital rectal exam, the Olympus 190 series colonoscope was passed through the anus and advanced under direct visualization to the cecum where landmarks were identified and photographed for documentation.  The cecum was normal.  The terminal ileum was cannulated, found to be normal and photographed.  The scope was slowly withdrawn to the colon where there was pandiverticular disease beginning in the ascending colon and continuing to the sigmoid colon.  No other mucosal change was noted throughout this extent.  Random biopsies were taken throughout all segments and submitted to pathology to rule out evidence of microscopic colitis.

## (undated) DEVICE — REM POLYHESIVE ADULT PATIENT RETURN ELECTRODE: Brand: VALLEYLAB

## (undated) DEVICE — DRAIN SURG 19FR 0.25IN SIL RND W/ TRCR INDIC DOT RADPQ FULL

## (undated) DEVICE — SUTURE STRATAFIX SZ 3-0 L30CM NONABSORBABLE UD L19MM FS-2 SXMP2B408

## (undated) DEVICE — GOWN,SIRUS,NONRNF,SETINSLV,XL,20/CS: Brand: MEDLINE

## (undated) DEVICE — PAD PT POS 36 IN SURGYPAD DISP

## (undated) DEVICE — GLOVE SURG SZ 65 THK91MIL LTX FREE SYN POLYISOPRENE

## (undated) DEVICE — FORCEPS BX L240CM JAW DIA2.8MM L CAP W/ NDL MIC MESH TOOTH

## (undated) DEVICE — SYRINGE MEDICAL 3ML CLEAR PLASTIC STANDARD NON CONTROL LUERLOCK TIP DISPOSABLE

## (undated) DEVICE — Device

## (undated) DEVICE — NEEDLE HYPO 21GA L1.5IN INTRAMUSCULAR S STL LATCH BVL UP

## (undated) DEVICE — SPONGE HEMOSTAT CELLULS 4X8IN -- SURGICEL

## (undated) DEVICE — YANKAUER,FLEXIBLE HANDLE,REGLR CAPACITY: Brand: MEDLINE INDUSTRIES, INC.

## (undated) DEVICE — TRAP SPEC POLYP REM STRNR CLN DSGN MAGNIFYING WIND DISP

## (undated) DEVICE — VESSEL SEALER XI EXTENDED DISP -- VESSEL

## (undated) DEVICE — KENDALL RADIOLUCENT FOAM MONITORING ELECTRODE RECTANGULAR SHAPE: Brand: KENDALL

## (undated) DEVICE — SCISSORS ENDOSCP DIA5MM CRV MPLR CAUT W/ RATCH HNDL

## (undated) DEVICE — COLUMN DRAPE

## (undated) DEVICE — HOOK RETRCT L5MM SEMI BLNT PRO STAY ELAS

## (undated) DEVICE — DRAPE,UTILITY,XL,4/PK,STERILE: Brand: MEDLINE

## (undated) DEVICE — MAJOR LITHOTOMY: Brand: MEDLINE INDUSTRIES, INC.

## (undated) DEVICE — PREMIUM WET SKIN PREP TRAY: Brand: MEDLINE INDUSTRIES, INC.

## (undated) DEVICE — REDUCER CANN ENDOWRIST 12-8MM -- DA VINCI XI - SNGL USE

## (undated) DEVICE — NEEDLE COUNTER: Brand: DEROYAL

## (undated) DEVICE — COVER MPLR TIP CRV SCIS ACC DA VINCI

## (undated) DEVICE — CANNULA CUSH AD W/ 14FT TBG

## (undated) DEVICE — CATHETER IV 22GA L1IN BLU POLYUR STR HUB RADPQ PROTCT +

## (undated) DEVICE — SYR 5ML 1/5 GRAD LL NSAF LF --

## (undated) DEVICE — TUBING, SUCTION, 1/4" X 12', STRAIGHT: Brand: MEDLINE

## (undated) DEVICE — MASTISOL ADHESIVE LIQ 2/3ML

## (undated) DEVICE — CATH URET 5FRX70CM W/OPN END -- BX/20

## (undated) DEVICE — SEAL UNIV 5-8MM DISP BX/10 -- DA VINCI XI - SNGL USE

## (undated) DEVICE — OBTRTR BLDELSS OPT 8MM DISP -- DA VINICI XI - SNGL USE

## (undated) DEVICE — SPONGE DRAIN NONWOVEN 4X4IN -- 2/PK

## (undated) DEVICE — GARMENT,MEDLINE,DVT,INT,CALF,MED, GEN2: Brand: MEDLINE

## (undated) DEVICE — SYR 20ML LL STRL LF --

## (undated) DEVICE — SYR 10ML LUER LOK 1/5ML GRAD --

## (undated) DEVICE — TOWEL,OR,DSP,ST,BLUE,STD,4/PK,20PK/CS: Brand: MEDLINE

## (undated) DEVICE — SOL IRRIGATION INJ NACL 0.9% 500ML BTL

## (undated) DEVICE — ARM DRAPE

## (undated) DEVICE — ROCKER SWITCH PENCIL HOLSTER: Brand: VALLEYLAB

## (undated) DEVICE — DEVON™ KNEE AND BODY STRAP 60" X 3" (1.5 M X 7.6 CM): Brand: DEVON

## (undated) DEVICE — SET,IRRIGATION,CYSTO/TUR,90": Brand: MEDLINE

## (undated) DEVICE — GDWIRE 3CM FLX-TIP 0.038X150CM -- BX/5 SENSOR

## (undated) DEVICE — SYRINGE MED 5ML STD CLR PLAS LUERLOCK TIP N CTRL DISP

## (undated) DEVICE — MAYO STAND COVER: Brand: CONVERTORS

## (undated) DEVICE — 3-0 COATED VICRYL PLUS UNDYED 1X27" SH --

## (undated) DEVICE — COVER LT HNDL PLAS RIG 2 PER PK

## (undated) DEVICE — SYR 3ML LL TIP 1/10ML GRAD --

## (undated) DEVICE — TOTAL TRAY, DB, 100% SILI FOLEY, 16FR 10: Brand: MEDLINE

## (undated) DEVICE — SOLUTION IV 1000ML 20MEQ K CHL IN 0.9% SOD CHL FLX CONT

## (undated) DEVICE — TRANSANAL ACCESS PLATFORM WITH INSUFFLATION STABILIZATION BAG: Brand: GELPOINT PATH TRANSANAL ACCESS PLATFORM

## (undated) DEVICE — VISUALIZATION SYSTEM: Brand: CLEARIFY

## (undated) DEVICE — SOLUTION LACTATED RINGERS INJECTION USP

## (undated) DEVICE — TRI-LUMEN FILTERED TUBE SET WITH ACTIVATED CHARCOAL FILTER: Brand: AIRSEAL

## (undated) DEVICE — ROBOTIC PACK: Brand: MEDLINE INDUSTRIES, INC.

## (undated) DEVICE — SUT SLK 0 30IN SH BLK --

## (undated) DEVICE — GLOVE SURG SZ 7 L12IN FNGR THK79MIL GRN LTX FREE

## (undated) DEVICE — RETRACTABLE L-HOOK LAPAROSCOPIC SEALER/DIVIDER: Brand: LIGASURE

## (undated) DEVICE — SUTURE ABSORBABLE MONOFILAMENT 2-0 SH 6 IN STRATAFIX SPRL SXPP1B415

## (undated) DEVICE — KIT SUTURING DEVICE M-CLOSE

## (undated) DEVICE — SUTURE VCRL + SZ 0 L27IN ABSRB VLT L26MM UR-6 5/8 CIR VCP603H

## (undated) DEVICE — STERILE SURGICAL LUBRICANT,  TUBE: Brand: SURGILUBE

## (undated) DEVICE — SUTURE MCRYL SZ 4-0 L18IN ABSRB UD L19MM PS-2 3/8 CIR PRIM Y496G

## (undated) DEVICE — SNARE POLYP SM W13MMXL240CM SHTH DIA2.4MM OVL FLX DISP

## (undated) DEVICE — SPONGE LAPAROTOMY W18XL18IN WHITE STRUNG RADIOPAQUE STERILE

## (undated) DEVICE — RESERVOIR,SUCTION,100CC,SILICONE: Brand: MEDLINE

## (undated) DEVICE — SYS SKIN CLOS 22CM -- DERMABOUND PRINEO

## (undated) DEVICE — ELECTRO LUBE IS A SINGLE PATIENT USE DEVICE THAT IS INTENDED TO BE USED ON ELECTROSURGICAL ELECTRODES TO REDUCE STICKING.: Brand: KEY SURGICAL ELECTRO LUBE

## (undated) DEVICE — AIRSEAL 5 MM ACCESS PORT AND LOW PROFILE OBTURATOR WITH BLADELESS OPTICAL TIP, 120 MM LENGTH: Brand: AIRSEAL

## (undated) DEVICE — WRISTBAND ID AD W2.5XL9.5CM RED VYN ADH CLSR UNI-PRINT

## (undated) DEVICE — SUTURE PDS II SZ 1 L36IN ABSRB VLT L36MM CT-1 1/2 CIR Z347H

## (undated) DEVICE — SURGIFOAM SPNG SZ 100

## (undated) DEVICE — TOURNIQUET PHLEB W1XL18IN BLU FLAT RL AND BND REUSE FOR IV

## (undated) DEVICE — BLUNTFILL: Brand: MONOJECT

## (undated) DEVICE — DISPOSABLE SUCTION/IRRIGATOR TUBE SET WITH TIP: Brand: AHTO

## (undated) DEVICE — SUTURE ETHLN SZ 2-0 L18IN NONABSORBABLE BLK L26MM FS 3/8 664G

## (undated) DEVICE — RING RETRCT W14.1XL14.1CM PLAS SELF RET ADJ LTWT DISP LONE 3307G] COOPER SURGICAL]

## (undated) DEVICE — AIRSEAL 8 MM ACCESS PORT AND LOW PROFILE OBTURATOR WITH BLADELESS OPTICAL TIP, 120 MM LENGTH: Brand: AIRSEAL

## (undated) DEVICE — SYRINGE 50ML E/T

## (undated) DEVICE — OPTIFOAM GENTLE LITE, BORDERED, 4X4: Brand: MEDLINE

## (undated) DEVICE — Device: Brand: DISPOSABLE BULB & BLADDER

## (undated) DEVICE — SOLUTION IV 1000ML 0.9% SOD CHL

## (undated) DEVICE — SHEET,DRAPE,40X58,STERILE: Brand: MEDLINE

## (undated) DEVICE — SHEET,DRAPE,70X100,STERILE: Brand: MEDLINE

## (undated) DEVICE — CATHETER PH SUCT 14FR